# Patient Record
Sex: FEMALE | Race: WHITE | Employment: FULL TIME | URBAN - NONMETROPOLITAN AREA
[De-identification: names, ages, dates, MRNs, and addresses within clinical notes are randomized per-mention and may not be internally consistent; named-entity substitution may affect disease eponyms.]

---

## 2017-07-13 ENCOUNTER — TELEPHONE (OUTPATIENT)
Dept: FAMILY MEDICINE CLINIC | Age: 32
End: 2017-07-13

## 2018-01-24 ENCOUNTER — TELEPHONE (OUTPATIENT)
Dept: FAMILY MEDICINE CLINIC | Age: 33
End: 2018-01-24

## 2018-06-08 ENCOUNTER — OFFICE VISIT (OUTPATIENT)
Dept: INTERNAL MEDICINE CLINIC | Age: 33
End: 2018-06-08

## 2018-06-08 VITALS
RESPIRATION RATE: 20 BRPM | OXYGEN SATURATION: 99 % | SYSTOLIC BLOOD PRESSURE: 124 MMHG | DIASTOLIC BLOOD PRESSURE: 86 MMHG | BODY MASS INDEX: 21.68 KG/M2 | WEIGHT: 127 LBS | HEIGHT: 64 IN | HEART RATE: 74 BPM

## 2018-06-08 DIAGNOSIS — J30.9 CHRONIC ALLERGIC RHINITIS, UNSPECIFIED SEASONALITY, UNSPECIFIED TRIGGER: Primary | ICD-10-CM

## 2018-06-08 DIAGNOSIS — J01.01 ACUTE RECURRENT MAXILLARY SINUSITIS: ICD-10-CM

## 2018-06-08 DIAGNOSIS — G43.109 MIGRAINE WITH AURA AND WITHOUT STATUS MIGRAINOSUS, NOT INTRACTABLE: ICD-10-CM

## 2018-06-08 PROCEDURE — 99213 OFFICE O/P EST LOW 20 MIN: CPT | Performed by: NURSE PRACTITIONER

## 2018-06-08 RX ORDER — AZELASTINE 1 MG/ML
2 SPRAY, METERED NASAL 2 TIMES DAILY
Qty: 1 BOTTLE | Refills: 3 | Status: SHIPPED | OUTPATIENT
Start: 2018-06-08 | End: 2019-12-06

## 2018-06-08 RX ORDER — SUMATRIPTAN 25 MG/1
25 TABLET, FILM COATED ORAL
Qty: 30 TABLET | Refills: 0 | Status: SHIPPED | OUTPATIENT
Start: 2018-06-08 | End: 2020-01-30 | Stop reason: ALTCHOICE

## 2018-06-08 RX ORDER — AMOXICILLIN AND CLAVULANATE POTASSIUM 875; 125 MG/1; MG/1
1 TABLET, FILM COATED ORAL EVERY 12 HOURS
Qty: 20 TABLET | Refills: 0 | Status: SHIPPED | OUTPATIENT
Start: 2018-06-08 | End: 2018-06-18

## 2019-09-18 ENCOUNTER — HOSPITAL ENCOUNTER (OUTPATIENT)
Age: 34
Setting detail: SPECIMEN
Discharge: HOME | End: 2019-09-18
Payer: COMMERCIAL

## 2019-09-18 PROCEDURE — 86735 MUMPS ANTIBODY: CPT

## 2019-09-18 PROCEDURE — 86787 VARICELLA-ZOSTER ANTIBODY: CPT

## 2019-09-18 PROCEDURE — 86765 RUBEOLA ANTIBODY: CPT

## 2019-09-18 PROCEDURE — 86762 RUBELLA ANTIBODY: CPT

## 2019-09-20 LAB
MUV IGG SER QL: 9.8 AU/ML
MUV IGG SER QL: NORMAL AU/ML
RUBELLA ANTIBODY IGG: 9.8 IU/ML
RUBELLA ANTIBODY IGG: NORMAL IU/ML
RUBEOLA (MEASLES) AB IGG: 22.1 AU/ML
RUBEOLA (MEASLES) AB IGG: NORMAL AU/ML
VARICELLA-ZOSTER VIRUS AB, IGG: 1506 IV
VARICELLA-ZOSTER VIRUS AB, IGG: NORMAL IV

## 2019-12-06 ENCOUNTER — OFFICE VISIT (OUTPATIENT)
Dept: ORTHOPEDIC SURGERY | Age: 34
End: 2019-12-06
Payer: COMMERCIAL

## 2019-12-06 VITALS
RESPIRATION RATE: 16 BRPM | HEIGHT: 64 IN | WEIGHT: 127 LBS | BODY MASS INDEX: 21.68 KG/M2 | HEART RATE: 90 BPM | OXYGEN SATURATION: 98 %

## 2019-12-06 DIAGNOSIS — M22.8X1 PATELLAR MALTRACKING, RIGHT: ICD-10-CM

## 2019-12-06 DIAGNOSIS — M22.41 PATELLA, CHONDROMALACIA, RIGHT: Primary | ICD-10-CM

## 2019-12-06 PROCEDURE — 20610 DRAIN/INJ JOINT/BURSA W/O US: CPT | Performed by: PHYSICIAN ASSISTANT

## 2019-12-06 PROCEDURE — 99202 OFFICE O/P NEW SF 15 MIN: CPT | Performed by: PHYSICIAN ASSISTANT

## 2019-12-10 ASSESSMENT — ENCOUNTER SYMPTOMS
RESPIRATORY NEGATIVE: 1
EYES NEGATIVE: 1
GASTROINTESTINAL NEGATIVE: 1

## 2020-01-29 ENCOUNTER — NURSE TRIAGE (OUTPATIENT)
Dept: OTHER | Facility: CLINIC | Age: 35
End: 2020-01-29

## 2020-01-29 NOTE — TELEPHONE ENCOUNTER
Reason for Disposition   Sinus congestion (pressure, fullness) present > 10 days    Protocols used: SINUS PAIN AND CONGESTION-ADULT-[place]    Caller reports she is suffering from sinus pain/congestion. Recommended caller complete an E-visit at this time.

## 2020-01-30 ENCOUNTER — OFFICE VISIT (OUTPATIENT)
Dept: INTERNAL MEDICINE CLINIC | Age: 35
End: 2020-01-30
Payer: COMMERCIAL

## 2020-01-30 VITALS
SYSTOLIC BLOOD PRESSURE: 112 MMHG | OXYGEN SATURATION: 100 % | BODY MASS INDEX: 22.86 KG/M2 | DIASTOLIC BLOOD PRESSURE: 76 MMHG | WEIGHT: 133.2 LBS | HEART RATE: 72 BPM

## 2020-01-30 PROBLEM — N94.6 DYSMENORRHEA: Status: ACTIVE | Noted: 2020-01-30

## 2020-01-30 PROBLEM — G43.009 MIGRAINE WITHOUT AURA AND WITHOUT STATUS MIGRAINOSUS, NOT INTRACTABLE: Status: ACTIVE | Noted: 2020-01-30

## 2020-01-30 PROCEDURE — 99204 OFFICE O/P NEW MOD 45 MIN: CPT | Performed by: FAMILY MEDICINE

## 2020-01-30 RX ORDER — AMITRIPTYLINE HYDROCHLORIDE 10 MG/1
10 TABLET, FILM COATED ORAL NIGHTLY
Qty: 30 TABLET | Refills: 0 | Status: SHIPPED | OUTPATIENT
Start: 2020-01-30 | End: 2021-12-28 | Stop reason: ALTCHOICE

## 2020-01-30 RX ORDER — MEDROXYPROGESTERONE ACETATE 150 MG/ML
150 INJECTION, SUSPENSION INTRAMUSCULAR
Qty: 1 ML | Refills: 3
Start: 2020-01-30 | End: 2020-01-30

## 2020-01-30 RX ORDER — RIZATRIPTAN BENZOATE 5 MG/1
5 TABLET ORAL
Qty: 30 TABLET | Refills: 0 | Status: SHIPPED | OUTPATIENT
Start: 2020-01-30 | End: 2020-07-09 | Stop reason: ALTCHOICE

## 2020-01-30 ASSESSMENT — ENCOUNTER SYMPTOMS
ABDOMINAL PAIN: 0
CONSTIPATION: 0
SORE THROAT: 0
RHINORRHEA: 1
VOMITING: 0
COLOR CHANGE: 0
DIARRHEA: 0
CHEST TIGHTNESS: 0
SHORTNESS OF BREATH: 0

## 2020-01-30 NOTE — PROGRESS NOTES
Subjective:      Chief Complaint   Patient presents with    Established New Doctor    Chest Congestion     Nasal congestion, body aches, throat sore. Headache worse    Medication Check     Taking tylenol sinus       HPI:  Nhi Willett is a 29 y.o. female who presents today to establish care. Does have a few concerns today. Has known history of migraines- was prescribed imitrex over a year ago, but does not help symptoms- states it only makes her lightheaded and nauseous. Has migraines about 3-4 times a week. No aura. Has been using OTC excedrin, which seems to help. Has never been prescribed any other migraine medication. Current migraine has lasted 5 days. Has also been having URI symptoms for the past 3 days. Having congestion, rhinorrhea, Tmax 100. Also asking about depo provera- was previously getting shots every 3 months but is switching providers and has not had injection since Sept 2019. Past Medical History:   Diagnosis Date    Anxiety     Chronic back pain     Depression 7/16/2015    Headache(784.0)     Ovarian cyst     multi    Restless legs syndrome         Past Surgical History:   Procedure Laterality Date    APPENDECTOMY      CYST REMOVAL      head     HERNIA REPAIR      OTHER SURGICAL HISTORY  07/08/2016    [facility]       Social History     Tobacco Use    Smoking status: Never Smoker    Smokeless tobacco: Never Used   Substance Use Topics    Alcohol use: Yes     Alcohol/week: 0.0 standard drinks     Comment: Socially        Review of Systems   Constitutional: Negative for activity change, appetite change, chills, fever and unexpected weight change. HENT: Positive for congestion, postnasal drip and rhinorrhea. Negative for sore throat. Respiratory: Negative for chest tightness and shortness of breath. Cardiovascular: Negative for chest pain and palpitations. Gastrointestinal: Negative for abdominal pain, constipation, diarrhea and vomiting. Genitourinary: Negative for dysuria. Skin: Negative for color change. Neurological: Positive for headaches. Negative for dizziness and light-headedness. Psychiatric/Behavioral: Negative for dysphoric mood. The patient is not nervous/anxious. Prior to Visit Medications    Medication Sig Taking? Authorizing Provider   Aspirin-Acetaminophen-Caffeine (EXCEDRIN MIGRAINE PO) Take 2,000 mg by mouth every morning Yes Historical Provider, MD          Objective:      /76   Pulse 72   Wt 133 lb 3.2 oz (60.4 kg)   SpO2 100%   BMI 22.86 kg/m²      Physical Exam  Vitals signs and nursing note reviewed. Constitutional:       General: She is not in acute distress. Appearance: Normal appearance. She is not ill-appearing or toxic-appearing. HENT:      Head: Normocephalic and atraumatic. Right Ear: External ear normal.      Left Ear: External ear normal.      Nose: Nose normal.      Mouth/Throat:      Pharynx: Oropharynx is clear. Eyes:      General: No scleral icterus. Right eye: No discharge. Left eye: No discharge. Extraocular Movements: Extraocular movements intact. Conjunctiva/sclera: Conjunctivae normal.   Neck:      Musculoskeletal: Normal range of motion and neck supple. No neck rigidity. Cardiovascular:      Rate and Rhythm: Normal rate and regular rhythm. Heart sounds: Normal heart sounds. Pulmonary:      Effort: Pulmonary effort is normal.      Breath sounds: Normal breath sounds. No wheezing or rales. Musculoskeletal:         General: No deformity. Skin:     General: Skin is warm and dry. Findings: No rash. Neurological:      General: No focal deficit present. Mental Status: She is alert. Mental status is at baseline. Motor: No weakness. Psychiatric:         Mood and Affect: Mood normal.         Behavior: Behavior normal.            Assessment / Plan:      1.  Migraine without aura and without status migrainosus, not

## 2020-07-09 ENCOUNTER — OFFICE VISIT (OUTPATIENT)
Dept: FAMILY MEDICINE CLINIC | Age: 35
End: 2020-07-09
Payer: COMMERCIAL

## 2020-07-09 VITALS
HEART RATE: 79 BPM | DIASTOLIC BLOOD PRESSURE: 84 MMHG | WEIGHT: 116.8 LBS | BODY MASS INDEX: 20.05 KG/M2 | SYSTOLIC BLOOD PRESSURE: 138 MMHG | OXYGEN SATURATION: 99 % | TEMPERATURE: 97.1 F

## 2020-07-09 PROCEDURE — 99213 OFFICE O/P EST LOW 20 MIN: CPT | Performed by: PHYSICIAN ASSISTANT

## 2020-07-09 RX ORDER — GABAPENTIN 300 MG/1
300 CAPSULE ORAL DAILY
Qty: 30 CAPSULE | Refills: 0 | Status: SHIPPED | OUTPATIENT
Start: 2020-07-09 | End: 2020-08-31 | Stop reason: SDUPTHER

## 2020-07-09 RX ORDER — VALACYCLOVIR HYDROCHLORIDE 1 G/1
1000 TABLET, FILM COATED ORAL 3 TIMES DAILY
Qty: 21 TABLET | Refills: 0 | Status: SHIPPED | OUTPATIENT
Start: 2020-07-09 | End: 2020-07-16

## 2020-07-09 NOTE — LETTER
[address] [facility],[address] WALK-IN CARE  [address] [place] [address] [id number]  Phone: [phone]  Fax: [phone]    Sujatha Tammy        July 9, 2020     Patient: Felix Leal   YOB: 1985   Date of Visit: 7/9/2020       To Whom it May Concern:    Felix Leal was seen in my clinic on 7/9/2020. She is cleared to return to work today without restrictions. If you have any questions or concerns, please don't hesitate to call.     Sincerely,           Amol Christian PA-C

## 2020-07-09 NOTE — PATIENT INSTRUCTIONS
Patient Education        Shingles: Care Instructions  Your Care Instructions     Shingles (herpes zoster) causes pain and a blistered rash. The rash can appear anywhere on the body but will be on only one side of the body, the left or right. It will be in a band, a strip, or a small area. The pain can be very severe. Shingles can also cause tingling or itching in the area of the rash. The blisters scab over after a few days and heal in 2 to 4 weeks. Medicines can help you feel better and may help prevent more serious problems caused by shingles. Shingles is caused by the same virus that causes chickenpox. When you have chickenpox, the virus gets into your nerve roots and stays there (becomes dormant) long after you get over the chickenpox. If the virus becomes active again, it can cause shingles. Follow-up care is a key part of your treatment and safety. Be sure to make and go to all appointments, and call your doctor if you are having problems. It's also a good idea to know your test results and keep a list of the medicines you take. How can you care for yourself at home? · Be safe with medicines. Take your medicines exactly as prescribed. Call your doctor if you think you are having a problem with your medicine. Antiviral medicine helps you get better faster. · Try not to scratch or pick at the blisters. They will crust over and fall off on their own if you leave them alone. · Put cool, wet cloths on the area to relieve pain and itching. You can also use calamine lotion. Try not to use so much lotion that it cakes and is hard to get off. · Put cornstarch or baking soda on the sores to help dry them out so they heal faster. · Do not use thick ointment, such as petroleum jelly, on the sores. This will keep them from drying and healing. · To help remove loose crusts, soak them in tap water. This can help decrease oozing, and dry and soothe the skin.   · Take an over-the-counter pain medicine, such as acetaminophen (Tylenol), ibuprofen (Advil, Motrin), or naproxen (Aleve). Read and follow all instructions on the label. · Avoid close contact with people until the blisters have healed. It is very important for you to avoid contact with anyone who has never had chickenpox or the chickenpox vaccine. Pregnant women, young babies, and anyone else who has a hard time fighting infection (such as someone with HIV, diabetes, or cancer) is especially at risk. When should you call for help? Call your doctor now or seek immediate medical care if:  · You have a new or higher fever. · You have a severe headache and a stiff neck. · You lose the ability to think clearly. · The rash spreads to your forehead, nose, eyes, or eyelids. · You have eye pain, or your vision gets worse. · You have new pain in your face, or you cannot move the muscles in your face. · Blisters spread to new parts of your body. Watch closely for changes in your health, and be sure to contact your doctor if:  · The rash has not healed after 2 to 4 weeks. · You still have pain after the rash has healed. Where can you learn more? Go to https://AmiarepeMyxer.opvizor. org and sign in to your [employer] account. Kan Speak in the LOOKSIMA box to learn more about \"Shingles: Care Instructions. \"     If you do not have an account, please click on the \"Sign Up Now\" link. Current as of: February 11, 2020               Content Version: 12.5  © 2006-2020 [employer]wise, Incorporated. Care instructions adapted under license by [facility] ([facility]). If you have questions about a medical condition or this instruction, always ask your healthcare professional. [address] any warranty or liability for your use of this information.

## 2020-07-09 NOTE — PROGRESS NOTES
7/9/2020    Corinne Pedlar    Chief Complaint   Patient presents with    Herpes Zoster       HPI  History was obtained from patient. Lang Garrett is a 28 y.o. female who presents today with complaints of possible shingles. She states that yesterday, she had a burning type pain right mid back. She states that she woke up this morning and it is very excruciating and burning pain. Her T-shirt even bothers the area. She states that there is a rash developing. She admits to having chickenpox as a child. She thinks that she may have had shingles in this exact location 3 months ago. She denies cold-like symptoms, fever, chills. She has no other complaints. REVIEW OF SYMPTOMS    Constitutional:  Denies fever, chills, weight loss or weakness  ENT:  Denies sinus congestion, drainage, sore throat  Cardiovascular:  Denies chest pain, palpitations or swelling  Respiratory:  Denies cough or shortness of breath  Musculoskeletal: Admits right-sided mid back pain. See HPI. Skin: Admits to developing a rash on right side of mid back.   See HPI  Neurologic:  Denies headache    PAST MEDICAL HISTORY  Past Medical History:   Diagnosis Date    Anxiety     Chronic back pain     Depression 7/16/2015    Headache(784.0)     Ovarian cyst     multi    Restless legs syndrome        FAMILY HISTORY  Family History   Problem Relation Age of Onset    Cancer Father         pancreas and lungs     Hypertension Father     Diabetes Father     Heart Disease Father     Heart Attack Father     Coronary Art Dis Father     High Cholesterol Father     Other Mother         autoimmune disease [employer] /liver issues     Anemia Mother     Arthritis Mother     Depression Sister     Clotting Disorder Maternal Grandmother     Cancer Maternal Grandfather     Parkinsonism Paternal Grandfather     Anxiety Disorder Sister        SOCIAL HISTORY  Social History     Socioeconomic History    Marital status:      Spouse name: None    Breath and Swelling    Augmentin [Amoxicillin-Pot Clavulanate]      Blurred Vision    Penicillins      Blurred vision        PHYSICAL EXAM    /84   Pulse 79   Temp 97.1 °F (36.2 °C) (Temporal)   Wt 116 lb 12.8 oz (53 kg)   SpO2 99%   BMI 20.05 kg/m²     Constitutional:  Well developed, well nourished  HENT:  Normocephalic, atraumatic  Eyes:  conjunctiva normal, no discharge, no scleral icterus  Lymphatic:  No lymphadenopathy noted  Cardiovascular:  Normal heart rate, normal rhythm, no murmurs, gallops or rubs  Thorax & Lungs:  Normal breath sounds, no respiratory distress, no wheezing  Skin: 1 vesicular lesion just superior to right flank. Back: There is exquisite tenderness to palpation just above the right flank that radiates around the right lateral ribs. No flank pain. No bony abnormality palpable. Neurologic:  Alert & oriented   Psychiatric:  Affect normal, mood normal    ASSESSMENT & [facility] was seen today for herpes zoster. Diagnoses and all orders for this visit:    Herpes zoster dermatitis  -     valACYclovir (VALTREX) 1 g tablet; Take 1 tablet by mouth 3 times daily for 7 days  -     gabapentin (NEURONTIN) 300 MG capsule; Take 1 capsule by mouth daily for 30 days. Valtrex and gabapentin as noted above. Patient understands that she is not to work or drive while taking gabapentin. We discussed possible side effects of both of these medicines in detail and patient voiced understanding and wishes to continue. She is to avoid known contact with anyone who has either not had chickenpox or has not been vaccinated against it. She is to avoid known contact with pregnant women. She is able to return to work because this rash is in an area that is well covered with clothing. Patient was warned about postherpetic neuralgia. Patient voiced understanding and all questions were answered.     Medications Discontinued During This Encounter   Medication Reason    rizatriptan (MAXALT) 5 MG tablet Therapy completed        No follow-ups on file. Plan of care reviewed with patient who verbalizes understanding and wishes to continue. Patient verbalizes understanding with the above plan and is in agreement. Patient will call with  worsening of symptoms, questions or concerns. Please note that this chart was generated using dragon dictation software. Although every effort was made to ensure the accuracy of this automated transcription, some errors in transcription may have occurred.     Electronically signed by Hector White PA-C on 7/9/2020

## 2020-08-03 RX ORDER — AZITHROMYCIN 250 MG/1
250 TABLET, FILM COATED ORAL SEE ADMIN INSTRUCTIONS
Qty: 6 TABLET | Refills: 0 | Status: SHIPPED | OUTPATIENT
Start: 2020-08-03 | End: 2020-08-08

## 2020-08-18 ENCOUNTER — TELEPHONE (OUTPATIENT)
Dept: ORTHOPEDIC SURGERY | Age: 35
End: 2020-08-18

## 2020-08-18 RX ORDER — PREDNISONE 20 MG/1
20 TABLET ORAL 2 TIMES DAILY
Qty: 14 TABLET | Refills: 0 | Status: SHIPPED | OUTPATIENT
Start: 2020-08-18 | End: 2020-08-25

## 2020-08-28 ENCOUNTER — PATIENT MESSAGE (OUTPATIENT)
Dept: INTERNAL MEDICINE CLINIC | Age: 35
End: 2020-08-28

## 2020-08-31 RX ORDER — GABAPENTIN 300 MG/1
300 CAPSULE ORAL DAILY
Qty: 30 CAPSULE | Refills: 0 | Status: SHIPPED | OUTPATIENT
Start: 2020-08-31 | End: 2021-12-28 | Stop reason: ALTCHOICE

## 2021-11-15 ENCOUNTER — HOSPITAL ENCOUNTER (OUTPATIENT)
Age: 36
Setting detail: SPECIMEN
Discharge: HOME | End: 2021-11-15
Payer: COMMERCIAL

## 2021-11-15 ENCOUNTER — OFFICE VISIT (OUTPATIENT)
Dept: OBGYN | Age: 36
End: 2021-11-15
Payer: COMMERCIAL

## 2021-11-15 VITALS
HEIGHT: 64 IN | WEIGHT: 118 LBS | DIASTOLIC BLOOD PRESSURE: 94 MMHG | BODY MASS INDEX: 20.14 KG/M2 | SYSTOLIC BLOOD PRESSURE: 142 MMHG

## 2021-11-15 DIAGNOSIS — Z01.419 WOMEN'S ANNUAL ROUTINE GYNECOLOGICAL EXAMINATION: Primary | ICD-10-CM

## 2021-11-15 DIAGNOSIS — Z80.41 FAMILY HISTORY OF MALIGNANT NEOPLASM OF OVARY IN FIRST DEGREE RELATIVE: ICD-10-CM

## 2021-11-15 DIAGNOSIS — R03.0 ELEVATED BLOOD PRESSURE READING IN OFFICE WITH WHITE COAT SYNDROME, WITHOUT DIAGNOSIS OF HYPERTENSION: ICD-10-CM

## 2021-11-15 DIAGNOSIS — N92.6 IRREGULAR MENSES: ICD-10-CM

## 2021-11-15 DIAGNOSIS — N64.4 MASTALGIA: ICD-10-CM

## 2021-11-15 PROCEDURE — 87624 HPV HI-RISK TYP POOLED RSLT: CPT

## 2021-11-15 PROCEDURE — 99385 PREV VISIT NEW AGE 18-39: CPT | Performed by: NURSE PRACTITIONER

## 2021-11-15 PROCEDURE — 88142 CYTOPATH C/V THIN LAYER: CPT

## 2021-11-15 RX ORDER — MEDROXYPROGESTERONE ACETATE 150 MG/ML
150 INJECTION, SUSPENSION INTRAMUSCULAR
Qty: 1 ML | Refills: 3 | Status: SHIPPED | OUTPATIENT
Start: 2021-11-15 | End: 2022-09-28

## 2021-11-15 SDOH — ECONOMIC STABILITY: FOOD INSECURITY: WITHIN THE PAST 12 MONTHS, THE FOOD YOU BOUGHT JUST DIDN'T LAST AND YOU DIDN'T HAVE MONEY TO GET MORE.: NEVER TRUE

## 2021-11-15 SDOH — ECONOMIC STABILITY: FOOD INSECURITY: WITHIN THE PAST 12 MONTHS, YOU WORRIED THAT YOUR FOOD WOULD RUN OUT BEFORE YOU GOT MONEY TO BUY MORE.: NEVER TRUE

## 2021-11-15 ASSESSMENT — ENCOUNTER SYMPTOMS
RESPIRATORY NEGATIVE: 1
GASTROINTESTINAL NEGATIVE: 1

## 2021-11-15 ASSESSMENT — SOCIAL DETERMINANTS OF HEALTH (SDOH): HOW HARD IS IT FOR YOU TO PAY FOR THE VERY BASICS LIKE FOOD, HOUSING, MEDICAL CARE, AND HEATING?: NOT HARD AT ALL

## 2021-11-15 NOTE — PROGRESS NOTES
11/15/21    Stanton Harmon  1985    Chief Complaint   Patient presents with    Gynecologic Exam     pt c/o right breast lump x 6 wks, dull pain, radiates down into ribs. menstrual nornal, Lmp 10/15/21. requesting to start depo provera. last pap - 2/27/2018. normal.     Breast Problem        The patient is a 39 y.o. female, Margaux Gates who presents for her annual exam.  She amenorrhea since discontinuing Depo Provera 4/2021. She is  sexually active. She is not currently taking birth control    She reports no gynecological symptoms. Pap smear history: Her last PAP smear was in 2018. Her results were normal.    Past Medical History:   Diagnosis Date    Abnormal Pap smear of cervix     Anxiety     Breast lump in female     Chronic back pain     Depression 7/16/2015    Headache(784.0)     Ovarian cyst     multi    Restless legs syndrome        Past Surgical History:   Procedure Laterality Date    APPENDECTOMY      CYST REMOVAL      head     HERNIA REPAIR      OTHER SURGICAL HISTORY  07/08/2016    lap appy       Family History   Problem Relation Age of Onset    Cancer Father         pancreas and lungs     Hypertension Father     Diabetes Father     Heart Disease Father     Heart Attack Father     Coronary Art Dis Father     High Cholesterol Father     Other Mother         autoimmune disease [facility] Kerene /liver issues     Anemia Mother     Arthritis Mother     Depression Sister     Clotting Disorder Maternal Grandmother     Cancer Maternal Grandfather     Parkinsonism Paternal Grandfather     Anxiety Disorder Sister        Social History     Tobacco Use    Smoking status: Never Smoker    Smokeless tobacco: Never Used   Vaping Use    Vaping Use: Never used   Substance Use Topics    Alcohol use:  Yes     Alcohol/week: 0.0 standard drinks     Comment: Socially    Drug use: No       Current Outpatient Medications   Medication Sig Dispense Refill    medroxyPROGESTERone (DEPO-PROVERA) 150 MG/ML injection Inject 1 mL into the muscle every 3 months 1 mL 3    Aspirin-Acetaminophen-Caffeine (EXCEDRIN MIGRAINE PO) Take 2,000 mg by mouth every morning       gabapentin (NEURONTIN) 300 MG capsule Take 1 capsule by mouth daily for 30 days. 30 capsule 0    amitriptyline (ELAVIL) 10 MG tablet Take 1 tablet by mouth nightly (Patient not taking: Reported on 11/15/2021) 30 tablet 0     No current facility-administered medications for this visit. Allergies   Allergen Reactions    Benadryl [Diphenhydramine-Zinc Acetate] Shortness Of Breath and Swelling    Augmentin [Amoxicillin-Pot Clavulanate]      Blurred Vision    Penicillins      Blurred vision        [id number]    Immunization History   Administered Date(s) Administered    DTaP 07/12/2014       Review of Systems   Constitutional: Negative. Respiratory: Negative. Gastrointestinal: Negative. Genitourinary: Negative. BP (!) 142/94   Ht 5' 4\" (1.626 m)   Wt 118 lb (53.5 kg)   LMP 10/15/2021   BMI 20.25 kg/m²     Physical Exam  Vitals reviewed. Constitutional:       Appearance: Normal appearance. HENT:      Head: Normocephalic. Pulmonary:      Effort: Pulmonary effort is normal.   Chest:   Breasts:      Right: Tenderness present. Left: Normal.         Abdominal:      Palpations: Abdomen is soft. Genitourinary:     General: Normal vulva. Vagina: Normal.      Cervix: Normal.      Uterus: Normal.       Adnexa: Right adnexa normal and left adnexa normal.      Rectum: Normal.   Musculoskeletal:         General: Normal range of motion. Cervical back: Normal range of motion. Skin:     General: Skin is warm and dry. Neurological:      Mental Status: She is alert. Psychiatric:         Mood and Affect: Mood normal.         Behavior: Behavior normal.         No results found for this visit on 11/15/21. Assessment and Plan   Diagnosis Orders   1. Women's annual routine gynecological examination  PAP SMEAR   2. Mastalgia  ALEJANDRO ADRIANNA DIGITAL DIAGNOSTIC UNILATERAL RIGHT    US BREAST RIGHT COMPLETE   3. Family history of malignant neoplasm of ovary in first degree relative     4. Irregular menses     5. Elevated blood pressure reading in office with white coat syndrome, without diagnosis of hypertension       Family history of ovarian cancer; mother and two maternal aunts. Info given on genetic counseling    BPs discussed; c/o white coat syndrome. Monitors BPs at work with readings of 130s/98, reviewed pre-hypertension state; admits to frequent Headaches, denies chest/abd pain and scotoma. Pt encouraged to monitor and f/u with PCP with readings over 140/90      R breast mammogram with u/s ordered        Return in about 1 year (around 11/15/2022).     Lyric Carter, APRN - CNP

## 2021-11-18 LAB
HPV HIGH RISK: NOT DETECTED
HPV, GENOTYPE 16: NOT DETECTED
HPV, GENOTYPE 18: NOT DETECTED

## 2021-12-28 ENCOUNTER — HOSPITAL ENCOUNTER (EMERGENCY)
Age: 36
Discharge: HOME | End: 2021-12-28
Attending: EMERGENCY MEDICINE
Payer: COMMERCIAL

## 2021-12-28 VITALS
TEMPERATURE: 98.5 F | DIASTOLIC BLOOD PRESSURE: 98 MMHG | SYSTOLIC BLOOD PRESSURE: 181 MMHG | WEIGHT: 115 LBS | BODY MASS INDEX: 19.63 KG/M2 | HEART RATE: 96 BPM | RESPIRATION RATE: 18 BRPM | OXYGEN SATURATION: 100 % | HEIGHT: 64 IN

## 2021-12-28 DIAGNOSIS — I10 ESSENTIAL HYPERTENSION: Primary | ICD-10-CM

## 2021-12-28 LAB
BACTERIA: ABNORMAL /HPF
BILIRUBIN URINE: NEGATIVE MG/DL
BLOOD, URINE: ABNORMAL
CAST TYPE: ABNORMAL /HPF
CLARITY: CLEAR
COLOR: YELLOW
CRYSTAL TYPE: NEGATIVE /HPF
EKG ATRIAL RATE: 76 BPM
EKG DIAGNOSIS: NORMAL
EKG P AXIS: 9 DEGREES
EKG P-R INTERVAL: 88 MS
EKG Q-T INTERVAL: 382 MS
EKG QRS DURATION: 76 MS
EKG QTC CALCULATION (BAZETT): 429 MS
EKG R AXIS: 39 DEGREES
EKG T AXIS: 26 DEGREES
EKG VENTRICULAR RATE: 76 BPM
EPITHELIAL CELLS, UA: 3 /HPF
GLUCOSE BLD-MCNC: 129 MG/DL
GLUCOSE BLD-MCNC: 129 MG/DL (ref 70–99)
GLUCOSE, URINE: NEGATIVE MG/DL
INTERPRETATION: NORMAL
KETONES, URINE: ABNORMAL MG/DL
LEUKOCYTE ESTERASE, URINE: NEGATIVE
NITRITE URINE, QUANTITATIVE: NEGATIVE
PH, URINE: 7 (ref 5–8)
PREGNANCY, URINE: NEGATIVE
PROTEIN UA: NEGATIVE MG/DL
RBC URINE: 4 /HPF (ref 0–6)
SPECIFIC GRAVITY UA: 1.02 (ref 1–1.03)
SPECIFIC GRAVITY, URINE: 1.02 (ref 1–1.03)
UROBILINOGEN, URINE: 0.2 MG/DL (ref 0.2–1)
WBC UA: 4 /HPF (ref 0–5)

## 2021-12-28 PROCEDURE — 99283 EMERGENCY DEPT VISIT LOW MDM: CPT

## 2021-12-28 PROCEDURE — 93010 ELECTROCARDIOGRAM REPORT: CPT | Performed by: INTERNAL MEDICINE

## 2021-12-28 PROCEDURE — 81025 URINE PREGNANCY TEST: CPT

## 2021-12-28 PROCEDURE — 82962 GLUCOSE BLOOD TEST: CPT

## 2021-12-28 PROCEDURE — 93005 ELECTROCARDIOGRAM TRACING: CPT | Performed by: EMERGENCY MEDICINE

## 2021-12-28 PROCEDURE — 81001 URINALYSIS AUTO W/SCOPE: CPT

## 2021-12-28 RX ORDER — LISINOPRIL AND HYDROCHLOROTHIAZIDE 12.5; 1 MG/1; MG/1
1 TABLET ORAL DAILY
Qty: 30 TABLET | Refills: 3 | Status: SHIPPED | OUTPATIENT
Start: 2021-12-28 | End: 2022-05-24

## 2021-12-28 NOTE — ED PROVIDER NOTES
Triage Chief Complaint:   Hypertension (x 1 week 180s/90 pt sent by pcp ) and Palpitations    [place]:  Sancho Duckworth is a 39 y.o. female that presents to the ED because of elevated blood pressure for the past weeks. States was over 150 the patient was recently started back on Depo about a month ago she has been having irregular. Currently bleeding she was off the year prior she is never been diagnosed with hypertension in the past.  She denies any headache but complains of some visual changes and palpitations. Denies any excessive alcohol. She drink socially no tobacco no marijuana no drugs. No chest pain or shortness of breath. She has made some weight gain about 12 to 15 pounds since she started the Depo.   Her rings are fitting little tighter        Past Medical History:   Diagnosis Date    Abnormal Pap smear of cervix     Anxiety     Breast lump in female     Chronic back pain     Depression 7/16/2015    Headache(784.0)     Ovarian cyst     multi    Restless legs syndrome      Past Surgical History:   Procedure Laterality Date    APPENDECTOMY      CYST REMOVAL      head     HERNIA REPAIR      OTHER SURGICAL HISTORY  07/08/2016    bry vila     Family History   Problem Relation Age of Onset    Cancer Father         pancreas and lungs     Hypertension Father     Diabetes Father     Heart Disease Father     Heart Attack Father     Coronary Art Dis Father     High Cholesterol Father     Other Mother         autoimmune disease Marilynne Meron /liver issues     Anemia Mother     Arthritis Mother     Depression Sister     Clotting Disorder Maternal Grandmother     Cancer Maternal Grandfather     Parkinsonism Paternal Grandfather     Anxiety Disorder Sister      Social History     Socioeconomic History    Marital status:      Spouse name: Not on file    Number of children: Not on file    Years of education: Not on file    Highest education level: Not on file   Occupational History    Not on file   Tobacco Use    Smoking status: Never Smoker    Smokeless tobacco: Never Used   Vaping Use    Vaping Use: Never used   Substance and Sexual Activity    Alcohol use: Yes     Alcohol/week: 0.0 standard drinks     Comment: Socially    Drug use: No    Sexual activity: Yes   Other Topics Concern    Not on file   Social History Narrative    Not on file     Social Determinants of Health     Financial Resource Strain: Low Risk     Difficulty of Paying Living Expenses: Not hard at all   Food Insecurity: No Food Insecurity    Worried About [address] in the Last Year: Never true    [facility] in the Last Year: Never true   Transportation Needs:     Lack of Transportation (Medical): Not on file    Lack of Transportation (Non-Medical): Not on file   Physical Activity:     Days of Exercise per Week: Not on file    Minutes of Exercise per Session: Not on file   Stress:     Feeling of Stress : Not on file   Social Connections:     Frequency of Communication with Friends and Family: Not on file    Frequency of Social Gatherings with Friends and Family: Not on file    Attends Voodoo Services: Not on file    Active Member of [address] [place] [address] or Organizations: Not on file    Attends Club or Organization Meetings: Not on file    Marital Status: Not on file   Intimate Partner Violence:     Fear of Current or Ex-Partner: Not on file    Emotionally Abused: Not on file    Physically Abused: Not on file    Sexually Abused: Not on file   Housing Stability:     Unable to Pay for Housing in the Last Year: Not on file    Number of [place] in the Last Year: Not on file    Unstable Housing in the Last Year: Not on file     No current facility-administered medications for this encounter.      Current Outpatient Medications   Medication Sig Dispense Refill    lisinopril-hydroCHLOROthiazide (PRINZIDE;ZESTORETIC) 10-12.5 MG per tablet Take 1 tablet by mouth daily 30 tablet 3    medroxyPROGESTERone tenderness. Abdominal:      General: Bowel sounds are normal. There is no distension. Palpations: Abdomen is soft. There is no mass. Tenderness: There is no abdominal tenderness. There is no guarding or rebound. Hernia: No hernia is present. Musculoskeletal:         General: No tenderness or deformity. Normal range of motion. Cervical back: Normal range of motion and neck supple. Lymphadenopathy:      Cervical: No cervical adenopathy. Skin:     General: Skin is warm and dry. Coloration: Skin is not pale. Findings: No erythema or rash. Neurological:      Mental Status: She is alert and oriented to person, place, and time. Cranial Nerves: No cranial nerve deficit. Sensory: No sensory deficit. Deep Tendon Reflexes: Reflexes are normal and symmetric. Reflexes normal.   Psychiatric:         Speech: Speech normal.         Behavior: Behavior normal.         Thought Content:  Thought content normal.         Judgment: Judgment normal.         I have reviewed and interpreted all of the currently available lab results from this visit (ifapplicable):  Results for orders placed or performed during the hospital encounter of 12/28/21   Urinalysis, reflex to microscopic   Result Value Ref Range    Color, UA YELLOW YELLOW    Clarity, UA CLEAR CLEAR    Glucose, Urine NEGATIVE NEGATIVE MG/DL    Bilirubin Urine NEGATIVE NEGATIVE MG/DL    Ketones, Urine 1+ (A) NEGATIVE MG/DL    Specific Gravity, UA 1.020 1.001 - 1.035    Blood, Urine 2+ (A) NEGATIVE    pH, Urine 7.0 5.0 - 8.0    Protein, UA NEGATIVE NEGATIVE MG/DL    Urobilinogen, Urine 0.2 0.2 - 1.0 MG/DL    Nitrite Urine, Quantitative NEGATIVE NEGATIVE    Leukocyte Esterase, Urine NEGATIVE NEGATIVE    RBC, UA 4 0 - 6 /HPF    WBC, UA 4 0 - 5 /HPF    Epithelial Cells, UA 3 /HPF    Cast Type NO CAST FORMS SEEN NO CAST FORMS SEEN /HPF    Bacteria, UA RARE (A) NEGATIVE /HPF    Crystal Type NEGATIVE NEGATIVE /HPF   Pregnancy, Urine Result Value Ref Range    Pregnancy, Urine NEGATIVE NEGATIVE    Specific Gravity, Urine 1.020 1.001 - 1.035    Interpretation HCG METHOD LIMITATIONS:    POCT Glucose   Result Value Ref Range    Glucose 129 mg/dL   POCT Glucose   Result Value Ref Range    POC Glucose 129 (H) 70 - 99 MG/DL   EKG 12 Lead   Result Value Ref Range    Ventricular Rate 76 BPM    Atrial Rate 76 BPM    P-R Interval 88 ms    QRS Duration 76 ms    Q-T Interval 382 ms    QTc Calculation (Bazett) 429 ms    P Axis 9 degrees    R Axis 39 degrees    T Axis 26 degrees    Diagnosis       Sinus rhythm with short RI  Otherwise normal ECG  No previous ECGs available        Radiographs (if obtained):  [] The following radiograph wasinterpreted by myself in the absence of a radiologist:   [] Radiologist's Report Reviewed:  No orders to display         EKG (if obtained): (All EKG's are interpreted by myself in the absence of a cardiologist)    The 12 lead EKG was interpreted by me, and the interpretation is as follows:  normal sinus rhythm, rate = 76. Intervals are within the normal range. QTc is not prolonged. ST elevations are not present. T wave inversions are not present. Non-specific T wave changes are not present. Delta waves, Brugada Syndrome, and Short RI are not present. There is no acute ischemia. Chart review shows recent radiographs:  No results found. MDM:      Patient presents ED with new onset hypertension. She has had values at home and was of an elevated she meets criteria for therapy will initially start on lisinopril 10/hydrochlorothiazide 12.5 patient was found not to be pregnant urinalysis is unremarkable need to follow-up next week    Please note that portions of this note may have been completed with a voice recognition/dictation program. Efforts were made to edit the dictations but occasionally words are mis-transcribed.      All pertinent Lab data and radiographic results reviewed with patient at bedside.  The patient and/or the family were informed of the results of any tests/labs/imaging, the treatment plan, and time was allotted to answer questions. See chart for details of medications given during the ED stay.     The likelihood of other entities in the differential is insufficient to justify any further testing for them. This was explained to the patient. The patient was advised that persistent or worsening symptoms would require further evaluation.              Clinical Impression:  1. Essential hypertension      Disposition referral (if applicable):  Alaina Simons MD  [facility] [address] [phone]    Schedule an appointment as soon as possible for a visit   If symptoms worsen    Disposition medications (if applicable):  New Prescriptions    LISINOPRIL-HYDROCHLOROTHIAZIDE (PRINZIDE;ZESTORETIC) 10-12.5 MG PER TABLET    Take 1 tablet by mouth daily           Moiz Meza DO, FACEP      Comment: Please note this report has been produced using speech recognition software and maycontain errors related to that system including errors in grammar, punctuation, and spelling, as well as words and phrases that may be inappropriate. If there are any questions or concerns please feel free to contact thedictating provider for clarification.         Gemma Grimm DO  12/28/21 [id number]

## 2022-05-13 ENCOUNTER — OFFICE VISIT (OUTPATIENT)
Dept: OBGYN | Age: 37
End: 2022-05-13
Payer: COMMERCIAL

## 2022-05-13 VITALS
WEIGHT: 123 LBS | HEIGHT: 64 IN | DIASTOLIC BLOOD PRESSURE: 80 MMHG | BODY MASS INDEX: 21 KG/M2 | SYSTOLIC BLOOD PRESSURE: 161 MMHG

## 2022-05-13 DIAGNOSIS — G89.29 CHRONIC FEMALE PELVIC PAIN: Primary | ICD-10-CM

## 2022-05-13 DIAGNOSIS — N92.6 IRREGULAR MENSES: ICD-10-CM

## 2022-05-13 DIAGNOSIS — N74 FEMALE PELVIC INFLAMMATORY DISORDERS IN DISEASES CLASSIFIED ELSEWHERE: ICD-10-CM

## 2022-05-13 DIAGNOSIS — R10.2 CHRONIC FEMALE PELVIC PAIN: Primary | ICD-10-CM

## 2022-05-13 DIAGNOSIS — N89.8 VAGINAL DISCHARGE: ICD-10-CM

## 2022-05-13 LAB
BACTERIA: ABNORMAL /HPF
BASOPHILS ABSOLUTE: 0 K/UL (ref 0–0.2)
BASOPHILS RELATIVE PERCENT: 0.5 %
BILIRUBIN URINE: NEGATIVE
BLOOD, URINE: NEGATIVE
CLARITY: CLEAR
COLOR: YELLOW
EOSINOPHILS ABSOLUTE: 0 K/UL (ref 0–0.6)
EOSINOPHILS RELATIVE PERCENT: 0.4 %
EPITHELIAL CELLS, UA: 1 /HPF (ref 0–5)
GLUCOSE URINE: NEGATIVE MG/DL
HCT VFR BLD CALC: 45 % (ref 36–48)
HEMOGLOBIN: 14.8 G/DL (ref 12–16)
HYALINE CASTS: 0 /LPF (ref 0–8)
KETONES, URINE: ABNORMAL MG/DL
LEUKOCYTE ESTERASE, URINE: ABNORMAL
LYMPHOCYTES ABSOLUTE: 1.8 K/UL (ref 1–5.1)
LYMPHOCYTES RELATIVE PERCENT: 26.7 %
MCH RBC QN AUTO: 30.7 PG (ref 26–34)
MCHC RBC AUTO-ENTMCNC: 33 G/DL (ref 31–36)
MCV RBC AUTO: 93 FL (ref 80–100)
MICROSCOPIC EXAMINATION: YES
MONOCYTES ABSOLUTE: 0.4 K/UL (ref 0–1.3)
MONOCYTES RELATIVE PERCENT: 6.6 %
NEUTROPHILS ABSOLUTE: 4.4 K/UL (ref 1.7–7.7)
NEUTROPHILS RELATIVE PERCENT: 65.8 %
NITRITE, URINE: NEGATIVE
PDW BLD-RTO: 12.8 % (ref 12.4–15.4)
PH UA: 7.5 (ref 5–8)
PLATELET # BLD: 270 K/UL (ref 135–450)
PMV BLD AUTO: 9.8 FL (ref 5–10.5)
PROTEIN UA: ABNORMAL MG/DL
RBC # BLD: 4.84 M/UL (ref 4–5.2)
RBC UA: 2 /HPF (ref 0–4)
SPECIFIC GRAVITY UA: 1.03 (ref 1–1.03)
URINE TYPE: ABNORMAL
UROBILINOGEN, URINE: 1 E.U./DL
WBC # BLD: 6.7 K/UL (ref 4–11)
WBC UA: 4 /HPF (ref 0–5)

## 2022-05-13 PROCEDURE — 99214 OFFICE O/P EST MOD 30 MIN: CPT | Performed by: OBSTETRICS & GYNECOLOGY

## 2022-05-13 RX ORDER — ESTRADIOL 1 MG/1
1 TABLET ORAL DAILY
Qty: 10 TABLET | Refills: 0 | Status: SHIPPED | OUTPATIENT
Start: 2022-05-13 | End: 2022-05-24

## 2022-05-13 RX ORDER — KETOROLAC TROMETHAMINE 10 MG/1
10 TABLET, FILM COATED ORAL EVERY 6 HOURS PRN
Qty: 20 TABLET | Refills: 0 | Status: SHIPPED | OUTPATIENT
Start: 2022-05-13 | End: 2022-08-08

## 2022-05-13 ASSESSMENT — PATIENT HEALTH QUESTIONNAIRE - PHQ9
9. THOUGHTS THAT YOU WOULD BE BETTER OFF DEAD, OR OF HURTING YOURSELF: 0
SUM OF ALL RESPONSES TO PHQ QUESTIONS 1-9: 0
SUM OF ALL RESPONSES TO PHQ9 QUESTIONS 1 & 2: 0
6. FEELING BAD ABOUT YOURSELF - OR THAT YOU ARE A FAILURE OR HAVE LET YOURSELF OR YOUR FAMILY DOWN: 0
5. POOR APPETITE OR OVEREATING: 0
SUM OF ALL RESPONSES TO PHQ QUESTIONS 1-9: 0
3. TROUBLE FALLING OR STAYING ASLEEP: 0
7. TROUBLE CONCENTRATING ON THINGS, SUCH AS READING THE NEWSPAPER OR WATCHING TELEVISION: 0
10. IF YOU CHECKED OFF ANY PROBLEMS, HOW DIFFICULT HAVE THESE PROBLEMS MADE IT FOR YOU TO DO YOUR WORK, TAKE CARE OF THINGS AT HOME, OR GET ALONG WITH OTHER PEOPLE: 0
SUM OF ALL RESPONSES TO PHQ QUESTIONS 1-9: 0
1. LITTLE INTEREST OR PLEASURE IN DOING THINGS: 0
8. MOVING OR SPEAKING SO SLOWLY THAT OTHER PEOPLE COULD HAVE NOTICED. OR THE OPPOSITE, BEING SO FIGETY OR RESTLESS THAT YOU HAVE BEEN MOVING AROUND A LOT MORE THAN USUAL: 0
SUM OF ALL RESPONSES TO PHQ QUESTIONS 1-9: 0
4. FEELING TIRED OR HAVING LITTLE ENERGY: 0
2. FEELING DOWN, DEPRESSED OR HOPELESS: 0

## 2022-05-13 NOTE — PROGRESS NOTES
5/13/22    Blaise Chong  1985    Chief Complaint   Patient presents with    Pelvic Pain     Pt c/o right sided tight cramping pelvic pain that radiates to lower back. x 4wks    Vaginal Discharge     Pt c/o black thick discharge with odor x 4 wks    Other     Pt seen at [facility] on 5/8/22 due to pain, they told her she had and ovarian cyst        Blaise Chong is a 40 y.o. female who presents today for evaluation of pelvic pain and discharge. The patient has a complaint of pelvic pain. RLQ. It began 1 month(s). She describes it as achy. It is a 2-7/10 on a pain scale. The patient has   none  Shedenies any UTI signs or symptoms. She denies back or flank pain. There is not associated pain with intercourse. ions. The pain described is not associated with her menstrual cycle. patient admits to of a vaginal discharge. It began 1 month(s) ago. She describes it as moderate. She states it does not have a fishy odor. She describes it as amost black. Past Medical History:   Diagnosis Date    Abnormal Pap smear of cervix     Anxiety     Breast lump in female     Chronic back pain     Depression 7/16/2015    Headache(784.0)     Ovarian cyst     multi    Restless legs syndrome        Past Surgical History:   Procedure Laterality Date    APPENDECTOMY      CYST REMOVAL      head     HERNIA REPAIR      OTHER SURGICAL HISTORY  07/08/2016    bry vila       Social History     Tobacco Use    Smoking status: Never Smoker    Smokeless tobacco: Never Used   Vaping Use    Vaping Use: Never used   Substance Use Topics    Alcohol use:  Yes     Alcohol/week: 0.0 standard drinks     Comment: Socially    Drug use: No       Family History   Problem Relation Age of Onset    Cancer Father         pancreas and lungs     Hypertension Father     Diabetes Father     Heart Disease Father     Heart Attack Father     Coronary Art Dis Father     High Cholesterol Father     Other Mother         autoimmune disease Raynaldo Gravel /liver issues     Anemia Mother     Arthritis Mother     Depression Sister     Clotting Disorder Maternal Grandmother     Cancer Maternal Grandfather     Parkinsonism Paternal Grandfather     Anxiety Disorder Sister        Current Outpatient Medications   Medication Sig Dispense Refill    ketorolac (TORADOL) 10 MG tablet Take 1 tablet by mouth every 6 hours as needed for Pain 20 tablet 0    estradiol (ESTRACE) 1 MG tablet Take 1 tablet by mouth daily for 10 days 10 tablet 0    lisinopril-hydroCHLOROthiazide (PRINZIDE;ZESTORETIC) 10-12.5 MG per tablet Take 1 tablet by mouth daily 30 tablet 3    medroxyPROGESTERone (DEPO-PROVERA) 150 MG/ML injection Inject 1 mL into the muscle every 3 months 1 mL 3    Aspirin-Acetaminophen-Caffeine (EXCEDRIN MIGRAINE PO) Take 2,000 mg by mouth every morning        No current facility-administered medications for this visit. Allergies   Allergen Reactions    Benadryl [Diphenhydramine-Zinc Acetate] Shortness Of Breath and Swelling    Augmentin [Amoxicillin-Pot Clavulanate]      Blurred Vision    Penicillins      Blurred vision        [id number]    Immunization History   Administered Date(s) Administered    COVID-19, [employer] top, DO NOT Dilute, [place]-Sucrose, 12+ yrs, PF, 30 mcg/0.3 mL dose 01/25/2022, 02/15/2022    DTaP 07/12/2014       Review of Systems   Genitourinary: Positive for pelvic pain and vaginal discharge. BP (!) 161/80   Ht 5' 4\" (1.626 m)   Wt 123 lb (55.8 kg)   BMI 21.11 kg/m²     Physical Exam  Exam conducted with a chaperone present. Constitutional:       Appearance: She is normal weight. HENT:      Head: Normocephalic and atraumatic. Nose: Nose normal.   Eyes:      Conjunctiva/sclera: Conjunctivae normal.   Cardiovascular:      Rate and Rhythm: Normal rate. Pulses: Normal pulses. Pulmonary:      Effort: Pulmonary effort is normal.   Abdominal:      General: Abdomen is flat.  Bowel sounds are normal. There is no distension. Palpations: Abdomen is soft. There is no mass. Tenderness: There is no abdominal tenderness. Hernia: No hernia is present. There is no hernia in the left inguinal area or right inguinal area. Genitourinary:     General: Normal vulva. Exam position: Lithotomy position. Labia:         Right: No rash, tenderness or lesion. Left: No rash, tenderness or lesion. Urethra: No prolapse, urethral pain or urethral lesion. Vagina: Normal. No vaginal discharge, erythema, tenderness or lesions. Cervix: No cervical motion tenderness, discharge, friability, lesion, erythema or cervical bleeding. Uterus: Normal. Tender. Adnexa:         Right: Tenderness present. No mass. Left: Tenderness present. No mass. Comments: Dark blood from cervix  Musculoskeletal:      Cervical back: Normal range of motion and neck supple. Lymphadenopathy:      Lower Body: No right inguinal adenopathy. No left inguinal adenopathy. Skin:     General: Skin is warm and dry. Neurological:      General: No focal deficit present. Mental Status: She is alert and oriented to person, place, and time. No results found for this visit on 05/13/22. ASSESSMENT AND PLAN   Diagnosis Orders   1. Chronic female pelvic pain  CBC with Auto Differential    Urinalysis with Microscopic    HCG Qualitative, Serum    ketorolac (TORADOL) 10 MG tablet   2. Vaginal discharge  Vaginal Pathogens Probes *A    US NON OB TRANSVAGINAL   3. Female pelvic inflammatory disorders in diseases classified elsewhere  Vaginal Pathogens Probes *A   4. Irregular menses  CBC with Auto Differential    TSH with Reflex to FT4    US NON OB TRANSVAGINAL    HCG Qualitative, Serum    estradiol (ESTRACE) 1 MG tablet       Return in about 1 week (around 5/20/2022).     Juancarlos Lara MD

## 2022-05-14 LAB
CANDIDA SPECIES, DNA PROBE: NORMAL
GARDNERELLA VAGINALIS, DNA PROBE: NORMAL
HCG QUALITATIVE: NEGATIVE
TRICHOMONAS VAGINALIS DNA: NORMAL
TSH REFLEX FT4: 0.72 UIU/ML (ref 0.27–4.2)

## 2022-05-24 ENCOUNTER — OFFICE VISIT (OUTPATIENT)
Dept: OBGYN | Age: 37
End: 2022-05-24
Payer: COMMERCIAL

## 2022-05-24 VITALS
DIASTOLIC BLOOD PRESSURE: 101 MMHG | SYSTOLIC BLOOD PRESSURE: 157 MMHG | HEIGHT: 64 IN | BODY MASS INDEX: 22.02 KG/M2 | WEIGHT: 129 LBS

## 2022-05-24 DIAGNOSIS — N94.89 PELVIC PAIN SYNDROME: ICD-10-CM

## 2022-05-24 PROCEDURE — 99213 OFFICE O/P EST LOW 20 MIN: CPT | Performed by: OBSTETRICS & GYNECOLOGY

## 2022-05-24 ASSESSMENT — ENCOUNTER SYMPTOMS
EYES NEGATIVE: 1
ALLERGIC/IMMUNOLOGIC NEGATIVE: 1
GASTROINTESTINAL NEGATIVE: 1
RESPIRATORY NEGATIVE: 1

## 2022-05-24 NOTE — PROGRESS NOTES
5/24/22    Sierra Morales  1985    Chief Complaint   Patient presents with    Follow-up     Pt here to f/u on US. Hx of pelvic pain. Pt continues to have pain. States bleeding has stopped. Sierra Morales is a 40 y.o. female who presents today for evaluation of pelvic pain. Pain x 1 month. Left and right sided. Worse with movement (bending,squatting,lifiting). Cramping and dull but sharp at other times. Past Medical History:   Diagnosis Date    Abnormal Pap smear of cervix     Anxiety     Breast lump in female     Chronic back pain     Depression 7/16/2015    Headache(784.0)     Ovarian cyst     multi    Pelvic pain     Restless legs syndrome        Past Surgical History:   Procedure Laterality Date    APPENDECTOMY      CYST REMOVAL      head     HERNIA REPAIR      OTHER SURGICAL HISTORY  07/08/2016    bry vila       Social History     Tobacco Use    Smoking status: Never Smoker    Smokeless tobacco: Never Used   Vaping Use    Vaping Use: Never used   Substance Use Topics    Alcohol use:  Yes     Alcohol/week: 0.0 standard drinks     Comment: Socially    Drug use: No       Family History   Problem Relation Age of Onset    Cancer Father         pancreas and lungs     Hypertension Father     Diabetes Father     Heart Disease Father     Heart Attack Father     Coronary Art Dis Father     High Cholesterol Father     Other Mother         autoimmune disease Kathrene Bath /liver issues     Anemia Mother     Arthritis Mother     Depression Sister     Clotting Disorder Maternal Grandmother     Cancer Maternal Grandfather     Parkinsonism Paternal Grandfather     Anxiety Disorder Sister        Current Outpatient Medications   Medication Sig Dispense Refill    ketorolac (TORADOL) 10 MG tablet Take 1 tablet by mouth every 6 hours as needed for Pain 20 tablet 0    estradiol (ESTRACE) 1 MG tablet Take 1 tablet by mouth daily for 10 days 10 tablet 0    lisinopril-hydroCHLOROthiazide (PRINZIDE;ZESTORETIC) 10-12.5 MG per tablet Take 1 tablet by mouth daily 30 tablet 3    medroxyPROGESTERone (DEPO-PROVERA) 150 MG/ML injection Inject 1 mL into the muscle every 3 months 1 mL 3    Aspirin-Acetaminophen-Caffeine (EXCEDRIN MIGRAINE PO) Take 2,000 mg by mouth every morning        No current facility-administered medications for this visit. Allergies   Allergen Reactions    Benadryl [Diphenhydramine-Zinc Acetate] Shortness Of Breath and Swelling    Augmentin [Amoxicillin-Pot Clavulanate]      Blurred Vision    Penicillins      Blurred vision        [id number]    Immunization History   Administered Date(s) Administered    COVID-Appcelerator, [employer] [employer] top, DO NOT Dilute, [place]-Sucrose, 12+ yrs, PF, 30 mcg/0.3 mL dose 01/25/2022, 02/15/2022    DTaP 07/12/2014       Review of Systems   Constitutional: Negative. HENT: Negative. Eyes: Negative. Respiratory: Negative. Cardiovascular: Negative. Gastrointestinal: Negative. Endocrine: Negative. Genitourinary: Positive for pelvic pain. Musculoskeletal: Negative. Skin: Negative. Allergic/Immunologic: Negative. Neurological: Negative. Hematological: Negative. Psychiatric/Behavioral: Negative. BP (!) 157/101   Ht 5' 4\" (1.626 m)   Wt 129 lb (58.5 kg)   LMP 04/29/2022 (Exact Date)   BMI 22.14 kg/m²     Physical Exam  Constitutional:       General: She is not in acute distress. Appearance: Normal appearance. She is not ill-appearing. HENT:      Head: Normocephalic. Nose: Nose normal.   Eyes:      General: No scleral icterus. Right eye: No discharge. Left eye: No discharge. Cardiovascular:      Pulses: Normal pulses. Pulmonary:      Effort: Pulmonary effort is normal.   Abdominal:      General: Abdomen is flat. There is no distension. Palpations: Abdomen is soft. There is no mass. Tenderness: There is no abdominal tenderness.       Hernia: No hernia is present. Musculoskeletal:         General: Normal range of motion. Cervical back: Normal range of motion and neck supple. No rigidity. Skin:     General: Skin is warm and dry. Neurological:      General: No focal deficit present. Mental Status: She is alert and oriented to person, place, and time. Psychiatric:         Mood and Affect: Mood normal.         Behavior: Behavior normal.       05/13/2022 1001 05/14/2022 0956 Vaginal Pathogens Probes *A [[id number]]    Vaginal    Component Value   Trichomonas Vaginalis DNA Negative DNA not detected. Normal range: Negative DNA not detected. GARDNERELLA VAGINALIS, DNA PROBE Negative DNA not detected. Normal range: Negative DNA not detected. MOISE SPECIES, DNA PROBE Negative DNA not detected. Normal range: Negative DNA not detected.             05/13/2022 0945 05/13/2022 1830 Urinalysis with Microscopic [[id number]]   (Abnormal)   Urine, clean catch    Component Value Units   Color, UA Yellow    Clarity, UA Clear    Glucose, Ur Negative mg/dL   Bilirubin Urine Negative    Ketones, Urine TRACE Abnormal  mg/dL   Specific Gravity, UA 1.028    Blood, Urine Negative    pH, UA 7.5    Protein, UA TRACE Abnormal  mg/dL   Urobilinogen, Urine 1.0 E.U./dL   Nitrite, Urine Negative    Leukocyte Esterase, Urine TRACE Abnormal     Microscopic Examination YES    Urine Type NotGiven    Bacteria, UA None Seen /HPF   Hyaline Casts, UA 0 /LPF   WBC, UA 4 /HPF   RBC, UA 2 /HPF   Epithelial Cells, UA 1  /HPF           05/13/2022 0939 05/14/2022 0146 HCG Qualitative, Serum [[id number]]   Blood    Component Value   hCG Qual Negative           05/13/2022 0939 05/14/2022 0421 TSH with Reflex to FT4 [[id number]]   Blood    Component Value Units   TSH Reflex FT4 0.72 uIU/mL           05/13/2022 0939 05/13/2022 1734 CBC with Auto Differential [[id number]]   Blood    Component Value Units   WBC 6.7 K/uL   RBC 4.84 M/uL   Hemoglobin 14.8 g/dL   Hematocrit 45.0 %   MCV 93.0 fL MCH 30.7 pg   MCHC 33.0 g/dL   RDW 12.8 %   Platelets 785 K/uL   MPV 9.8 fL   Neutrophils % 65.8 %   Lymphocytes % 26.7 %   Monocytes % 6.6 %   Eosinophils % 0.4 %   Basophils % 0.5 %   Neutrophils Absolute 4.4 K/uL   Lymphocytes Absolute 1.8 K/uL   Monocytes Absolute 0.4 K/uL   Eosinophils Absolute 0.0 K/uL   Basophils Absolute 0.0 K/uL             See ultrasound    No results found for this visit on 05/24/22. ASSESSMENT AND PLAN   Diagnosis Orders   1. Pelvic pain       Patient desires to monitor pain. Will consider laparoscopy if it does not improve  Also will see pcp    Reports normal bp at work today  No follow-ups on file.     Aminata Rivera MD

## 2022-06-17 DIAGNOSIS — S91.302A OPEN WOUND OF LEFT FOOT, INITIAL ENCOUNTER: Primary | ICD-10-CM

## 2022-06-17 RX ORDER — CEPHALEXIN 500 MG/1
500 CAPSULE ORAL 2 TIMES DAILY
Qty: 14 CAPSULE | Refills: 0 | Status: SHIPPED | OUTPATIENT
Start: 2022-06-17 | End: 2022-08-08 | Stop reason: ALTCHOICE

## 2022-08-08 ENCOUNTER — OFFICE VISIT (OUTPATIENT)
Dept: NEUROSURGERY | Age: 37
End: 2022-08-08
Payer: COMMERCIAL

## 2022-08-08 VITALS
RESPIRATION RATE: 16 BRPM | WEIGHT: 128.5 LBS | HEART RATE: 66 BPM | OXYGEN SATURATION: 98 % | BODY MASS INDEX: 21.94 KG/M2 | SYSTOLIC BLOOD PRESSURE: 138 MMHG | DIASTOLIC BLOOD PRESSURE: 92 MMHG | HEIGHT: 64 IN

## 2022-08-08 DIAGNOSIS — M54.12 CERVICAL RADICULOPATHY: Primary | ICD-10-CM

## 2022-08-08 DIAGNOSIS — M47.812 CERVICAL SPONDYLOSIS: ICD-10-CM

## 2022-08-08 DIAGNOSIS — G43.009 MIGRAINE WITHOUT AURA AND WITHOUT STATUS MIGRAINOSUS, NOT INTRACTABLE: ICD-10-CM

## 2022-08-08 DIAGNOSIS — M62.838 CERVICAL PARASPINAL MUSCLE SPASM: ICD-10-CM

## 2022-08-08 PROCEDURE — 99203 OFFICE O/P NEW LOW 30 MIN: CPT | Performed by: PHYSICIAN ASSISTANT

## 2022-08-08 RX ORDER — CYCLOBENZAPRINE HCL 5 MG
5 TABLET ORAL 3 TIMES DAILY PRN
Qty: 30 TABLET | Refills: 0 | Status: SHIPPED | OUTPATIENT
Start: 2022-08-08 | End: 2022-08-18

## 2022-08-08 NOTE — PROGRESS NOTES
Neurosurgery Office Note    HPI:  40 y.o. 1985  Who presents today with complaints of neck pain that has been chronic for many years but has acutely worsened over the past 3-5 weeks. 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 She has never had formal PT but does exercises and neck stretches daily. She also worked with a chiropractor for 3-4 years but did not notice any lasting relief from it. Over the past several weeks her pain has become more consistent and severe. With that she experiences headaches that radiate from the base of her skull up the back of her head and into her frontal scalp bilaterally. She states that she had to go to the ER in [facility] last Wednesday to 2 severe pain. She states that she had never had a headache that severe before. They did perform a CT of the head and she states that she was told it was, \"normal\". Unable to see impression of the scan in care everywhere. She reports some left facial numbness that comes and goes as well as numbness and tingling in her left arm and a C5-7 distribution. She denies any numbness in her right arm. She does admit to decreased weakness with her left hand . She has seen Dr. Sera Ignacio who referred her to [facility] for suspected thoracic outlet syndrome. She states that they offered her surgery but she was not interested in that at the time. She does admit to feeling unsteady with ambulation at times as well as with standing still, she feels that she leans to the left. She denies any pain or numbness in her lower extremities, denies urinary urgency or bowel/bladder incontinence. Denies vision changes. She has tried pain medications such as Tylenol, naproxen, cervical epidural steroid injections and cervical trigger point injections, chiropractic therapy all without lasting relief.   She has seen Dr. Cornelius Carmen previously, request not to see him again if possible. Patient is not on any antiplatelets or anticoagulants. PMHx positive for chronic back pain, RLS, depression, migraines, pelvic pain. Past Surgical history positive for appendectomy, hernia repair, cyst removal.                 Past Medical and Surgical History:       Diagnosis Date    Abnormal Pap smear of cervix     Anxiety     Breast lump in female     Chronic back pain     Depression 7/16/2015    Headache(784.0)     Ovarian cyst     multi    Pelvic pain     Restless legs syndrome          Procedure Laterality Date    APPENDECTOMY      CYST REMOVAL      head     HERNIA REPAIR      OTHER SURGICAL HISTORY  07/08/2016    lap appy       Social History:    TOBACCO:   reports that she has never smoked. She has never used smokeless tobacco.  ETOH:   reports current alcohol use. Family History:       Problem Relation Age of Onset    Cancer Father         pancreas and lungs     Hypertension Father     Diabetes Father     Heart Disease Father     Heart Attack Father     Coronary Art Dis Father     High Cholesterol Father     Other Mother         autoimmune disease Nelda Waterford /liver issues     Anemia Mother     Arthritis Mother     Depression Sister     Clotting Disorder Maternal Grandmother     Cancer Maternal Grandfather     Parkinsonism Paternal Grandfather     Anxiety Disorder Sister        Current Medications:    No current facility-administered medications for this visit.     Allergies   Allergen Reactions    Benadryl [Diphenhydramine-Zinc Acetate] Shortness Of Breath and Swelling    Augmentin [Amoxicillin-Pot Clavulanate]      Blurred Vision    Penicillins      Blurred vision         REVIEW OF SYSTEMS:    CONSTITUTIONAL:  negative for fevers, chills, diaphoresis, activity change, appetite change  EYES:  negative for blurred vision, eye discharge, visual disturbance and icterus  HEENT:  negative for hearing loss, tinnitus, ear drainage, sinus pressure, nasal congestion  RESPIRATORY:  No cough, shortness of breath, hemoptysis  GASTROINTESTINAL:  negative for nausea, vomiting, diarrhea, constipation  GENITOURINARY:  negative for frequency, dysuria, urinary incontinence, decreased urine volume, and hematuria  HEMATOLOGIC/LYMPHATIC:  negative for easy bruising, bleeding and lymphadenopathy  MUSCULOSKELETAL: positive for neck pain, negative for joint swelling, decreased range of motion and muscle weakness  NEUROLOGICAL: positive for headaches, negative for slurred speech, unilateral weakness  PSYCHIATRIC/BEHAVIORAL: negative for hallucinations, behavioral problems, confusion and agitation. Objective:   PHYSICAL EXAM:      VITALS:  BP (!) 138/92 (Site: Right Upper Arm, Position: Sitting, Cuff Size: Medium Adult)   Pulse 66   Resp 16   Ht 5' 4\" (1.626 m)   Wt 128 lb 8 oz (58.3 kg)   SpO2 98%   BMI 22.06 kg/m²      24HR INTAKE/OUTPUT:  No intake or output data in the 24 hours ending 08/08/22 1228  CONSTITUTIONAL:  Awake, alert, cooperative, no apparent distress, and appears stated age  HEENT: NCAT, PERRL, EOMI, Sclera white, conjunctive full, tongue protrudes midline  PSYCHIATRIC: Oriented to person place and time. No obvious depression or anxiety. MUSCULOSKELETAL: No obvious misalignment or effusion of the joints. No clubbing, cyanosis of the digits. SKIN:  normal skin color, texture, turgor and no redness, warmth, or swelling. NEUROLOGIC: Alert and oriented x4, face symmetrical, no obvious droop, speech clear and coherent, sensation decreased to light touch and pinprick sensation in left C5-7 distribution, steady gait with ambulation  Upper extremity strength: Bilateral upper extremities 5/5 throughout except for left handgrip which is 4/5 in left interosseous which is 4/5.   No Daryl's bilaterally, no clonus detected  Lower extremity strength: Bilateral lower extremities 5/5 throughout, no clonus detected bilaterally    DATA:    Old records have been reviewed    Radiology Review:  All pertinent images / reports were reviewed as a part of this visit. Cervical AP/LAT and flexion/extension x-rays 8/8/2022  Impression not available yet. On my review patient has straightening of her lordotic curve with slight reversal.  No significant degenerative disc disease noted. Assessment and plan:     1. Cervical radiculopathy  2. Cervical spondylosis  3. Cervical paraspinal muscle spasm  4. Migraine without aura and without status migrainosus, not intractable      Reviewed cervical xrays with the patient. She has straightening of her lordotic curve with slight reversal.  No significant degenerative disc disease. Because of her abnormal alignment, this could be causing some stenosis of her neural foramina causing a left C5-7 radiculopathy. Previously she has had trigger point injections as well as cervical epidural steroid injections with minimal relief. I do believe that trigger point injections with Botox via neurology would significantly help her migraines as well as her muscle spasms. She reports difficulty with sleeping, only got 4 hours of sleep last night. I did prescribe Flexeril 5 mg and advised her to take one tablet at night. She is currently under a lot of stress as she is working full-time and studying for exam and has been in the process of moving. MRI of the cervical spine ordered to rule out DDD and structural pathology within the neural foramen. Next steps: Follow-up with neurology to get cervical and skull base botox injections for muscle relief. Obtain MRI cervical, notify office when imaging becomes available. Electronically signed by Domi Chávez PA-C on 8/8/2022 at 12:28 PM      Supervising physician: Francois Gallardo. Herbie Zamarripa MD.  Dr. Herbie Zamarripa was readily and continuously available by phone for direct consultation regarding the care of this patient.     Time spent with patient in consultation, education, and collaboration with medical time is >50% of total time spent on case, including time spent in chart review and dictation. Total time spent: 30 minutes    Thank you for the opportunity to participate in the care of your patient.

## 2022-08-08 NOTE — PROGRESS NOTES
Patient is a 40 y.o. y.o. female who presents to the office today as a new patient for an evaluation of neck pain. Patient states she has had neck pain for several years and that it has gotten worse the past couple months. She she gets sharp, shooting pains, \"stinger\" and migraines. Pain scale 3/10 today. States she has taken muscle relaxers, NSAIDS, naproxen, Imitrex and caffeine with no relief. States she does have some numbness and tingling in her left hand - the first 3 fingers. Patient has been to chiropractor for numerous adjustments and has had massage therapy. Has also had steroid injections in her neck and a TENS unit. Has not tried PT, but states she does home exercises and stretches daily. Patient has been ambulating without any assistive device.

## 2022-08-16 ENCOUNTER — OFFICE VISIT (OUTPATIENT)
Dept: NEUROLOGY | Age: 37
End: 2022-08-16
Payer: COMMERCIAL

## 2022-08-16 VITALS
SYSTOLIC BLOOD PRESSURE: 132 MMHG | WEIGHT: 127.4 LBS | DIASTOLIC BLOOD PRESSURE: 70 MMHG | HEART RATE: 63 BPM | BODY MASS INDEX: 21.75 KG/M2 | HEIGHT: 64 IN | OXYGEN SATURATION: 99 %

## 2022-08-16 DIAGNOSIS — M54.2 NECK PAIN: ICD-10-CM

## 2022-08-16 DIAGNOSIS — G43.009 MIGRAINE WITHOUT AURA AND WITHOUT STATUS MIGRAINOSUS, NOT INTRACTABLE: Primary | ICD-10-CM

## 2022-08-16 PROCEDURE — 99205 OFFICE O/P NEW HI 60 MIN: CPT | Performed by: PSYCHIATRY & NEUROLOGY

## 2022-08-16 RX ORDER — GALCANEZUMAB 120 MG/ML
120 INJECTION, SOLUTION SUBCUTANEOUS
Qty: 1 PEN | Refills: 11 | Status: SHIPPED | OUTPATIENT
Start: 2022-09-09 | End: 2022-09-28

## 2022-08-16 NOTE — PROGRESS NOTES
8/16/22    Luna Atkinson  1985    Chief Complaint   Patient presents with    Migraine     Pt presents for migraines, pt states her migraines are random throughout the day or all day long and have become worse in the last 2 months, nausea vomiting, numbness in the face, blurred vision are all present with the migraine episodes       History of Present Illness  Erica is a 40 y.o. female presenting today for evaluation of:  Migraines    She states she has had migraines since she was a teenager. They have been worse over the past month. When they are really bad she will have numbness on the side of her head that sometimes goes into her cheek. She does get nauseous and vomits about 5% of the time. She will get sensitivity to lights and sounds. She would get blurry vision. Sometimes she will feel like she is in the \"the twilight zone\". Her most recent was this Saturday. Over the last year she has been having a migraine essentially every week. They are in the back of the head and the forehead. They are throbbing in quality. They can get to a 10 out of 10 in intensity but with Excedrin Migraine they go down to a 7 out of 8 out of 10 in intensity. She states she will have a lingering headache for about 2 days afterwards. She has had some stress. She moved about a month ago. 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 She is doing this at [address] [place] [address]. 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 For her migraine she has tried Maxalt and Imitrex in the past which were not helpful and increase her nausea and vomiting caused her to have sweats. She tried Nurtec ODT which was not helpful. She does have bad neck pain. She states it is always sore. Is particularly tender on the left. She does yoga every day. She does massage therapy once per month.   She was doing chiropractics but she stopped this. She does have numbness of the first 3 fingers on her left hand. She was told this is from thoracic outlet syndrome. She was offered surgery to remove her first rib she tells me but she did not want to pursue that. She tells me that her grandfather has bad migraines and he has a vagal nerve stimulator. For migraine she has tried Flexeril which has helped with sleep but not pain. She tried amitriptyline which was not helpful. She has tried gabapentin which helped with her neck pain and paresthesias but did not help with her migraines. Subjective    Review of Symptoms:  Neurologic   Symptoms: sensory disturbances, headaches, no difficulty with gait or walking, no bowel symptoms, no vertigo, no confusion, no memory loss, no speech disorder, no visual loss, no double vision, no dizziness, no loss of hearing, no weakness, no bladder symptoms, no seizures, no excessive fatigue, and no syncope    Current Outpatient Medications   Medication Sig Dispense Refill    [START ON 9/9/2022] Galcanezumab-gnlm (EMGALITY) 120 MG/ML SOAJ Inject 120 mg into the skin every 30 days Do not fill until after 1st month's dose 1 pen 11    cyclobenzaprine (FLEXERIL) 5 MG tablet Take 1 tablet by mouth 3 times daily as needed for Muscle spasms 30 tablet 0    medroxyPROGESTERone (DEPO-PROVERA) 150 MG/ML injection Inject 1 mL into the muscle every 3 months 1 mL 3     No current facility-administered medications for this visit.        Past Medical History:   Diagnosis Date    Abnormal Pap smear of cervix     Anxiety     Breast lump in female     Chronic back pain     Depression 7/16/2015    Headache(784.0)     Ovarian cyst     multi    Pelvic pain     Restless legs syndrome        Past Surgical History:   Procedure Laterality Date    APPENDECTOMY      CYST REMOVAL      head     HERNIA REPAIR      OTHER SURGICAL HISTORY  07/08/2016    bry appy        Social History     Socioeconomic History    Marital status:  Spouse name: None    Number of children: None    Years of education: None    Highest education level: None   Tobacco Use    Smoking status: Never    Smokeless tobacco: Never   Vaping Use    Vaping Use: Never used   Substance and Sexual Activity    Alcohol use:  Yes     Alcohol/week: 0.0 standard drinks     Comment: Socially    Drug use: No    Sexual activity: Yes     Social Determinants of Health     Financial Resource Strain: Low Risk     Difficulty of Paying Living Expenses: Not hard at all   Food Insecurity: No Food Insecurity    Worried About Running Out of Food in the Last Year: Never true    Ran Out of Food in the Last Year: Never true       Family History   Problem Relation Age of Onset    Cancer Father         pancreas and lungs     Hypertension Father     Diabetes Father     Heart Disease Father     Heart Attack Father     Coronary Art Dis Father     High Cholesterol Father     Other Mother         autoimmune disease Gennett Shouts /liver issues     Anemia Mother     Arthritis Mother     Depression Sister     Clotting Disorder Maternal Grandmother     Cancer Maternal Grandfather     Parkinsonism Paternal Grandfather     Anxiety Disorder Sister        Objective    Physical Exam:    Constitutional   Weight: well nourished  Heart/Vascular   Rate and Rhythm: RRR   Murmurs: none   Arterial Pulses:  no carotid bruits  Neck   Appearance/Palpation/Auscultation: supple  Mental Status   Orientation: oriented to person, oriented to place, oriented to problem, and oriented to time   Mood/Affect: appropriate mood and appropriate affect   Memory/Other: recent memory intact, remote memory intact, fund of knowledge intact, attention span normal, and concentration normal  Language   Language: (normal) language, no dysarthria, (normal) articulation, and no dysphasia/aphasia  Cranial Nerves   CN II Right: visual fields appear intact   CN II Left: visual fields appear intact   CN III, IV, VI: EOM no nystagmus, normal pursuit, and extraocular muscle strength normal   CN III: pupil normal size, pupil reactive to light and dark, pupil accomodates, and no ptosis   CN IV: normal   CN VI: normal   CN V Right: normal sensation and muscles of mastication intact   CN V Left: normal sensation and muscles of mastication intact   CN VII Right: normal facial expression   CN VII Left: normal facial expression   CN VIII Right: hearing in tact to normal conversation   CN VIII Left: hearing in tact to normal conversation   CN IX,X: normal palatal movement   CN XI Right: normal sternocleidomastoid and normal trapezius   CN XI Left: normal sternocleidomastoid and normal trapezius   CN XII: no tremors of the tongue, no fasciculation of the tongue, tongue protrudes midline, normal power to left, and normal power to right  Gait and Stance   Gait/Posture: station normal, ambulates independently, gait normal, tandem gait normal, and Romberg's test normal  Motor/Coordination Exam   General: no bradykinesia, no tremors, no chorea, no athetosis, no myoclonus, and no dyskinesia   Right Upper Extremity: normal motor strength, normal bulk, and normal tone   Left Upper Extremity: normal motor strength, normal bulk, and normal tone   Right Lower Extremity: normal motor strength, normal bulk, and normal tone   Left Lower Extremity: normal motor strength, normal bulk, and normal tone   Coordination: no drift, normal finger-to-nose, and rapid alternating movements normal  Reflexes   Reflexes Right: DTRS are normal throughout   Reflexes Left: DTRS are normal throughout   Plantar Reflex Right: response downgoing   Plantar Reflex Left: response downgoing   Hoffmans Reflex Right: absent   Hoffmans Reflex Left: absent  Sensory   Sensation: normal light touch, normal temperature, normal vibration, and no neglect  Spine   Cervical Spine: no tenderness, no dystonia , and full ROM   Thoracic Spine: no spasms, no bony abnormalities, normal curvature, no tenderness, and full ROM   Low Back: full ROM, no pain, no spasms, and no bony abnormalities  Lungs   Auscultation: normal breath sounds  Skin   Inspection: no jaundice, no lesions, no rashes, and no cyanosis      /70 (Site: Left Upper Arm, Position: Sitting, Cuff Size: Medium Adult)   Pulse 63   Ht 5' 4\" (1.626 m)   Wt 127 lb 6.4 oz (57.8 kg)   SpO2 99%   BMI 21.87 kg/m²     Assessment and Plan     Diagnosis Orders   1. Migraine without aura and without status migrainosus, not intractable  Galcanezumab-gnlm (EMGALITY) 120 MG/ML SOAJ      2. Neck pain            Lia has frequent episodic migraines. Much of her migraines are likely cervicogenic in nature due to her significant left-sided paraspinal and trapezius tenderness. Recommendations are as follows:  -- She has trialed preventative strategies with Flexeril, amitriptyline, and gabapentin without efficacy. We will trial once a month injectable CGRP inhibitor Emgality  -- From an abortive standpoint she did not have efficacy with sumatriptan, rizatriptan, or Nurtec ODT. I will give her samples of Ubrelvy and he Elyxyb to trial to see if either of these may provide her with relief. -- Her primary care office has ordered an MRI of her cervical spine given her significant neck pain and paraspinal muscle discomfort. -- She will continue with medical massage and yoga/stretching for her neck pain. -- We discussed the importance of keeping a detailed headache diary. We discussed trying to identify headache triggers. We discussed the importance of staying well hydrated, eating three regular meals each day, getting plenty of hours of fitful sleep, and reducing stress to help prevent headaches. -- Medications prescribed for the patient were discussed in detail. This included a discussion of the potential risks vs the potential benefits of the medications. The patient was given time to ask questions and these were answered to the best of my ability.  The patient appeared to understand the information provided. Return in about 4 months (around 12/16/2022) for Follow-up with Dr. Vandana Roberts,

## 2022-08-23 RX ORDER — UBROGEPANT 100 MG/1
TABLET ORAL
Qty: 2 TABLET | Refills: 0 | COMMUNITY
Start: 2022-08-23 | End: 2022-09-28

## 2022-08-23 RX ORDER — GALCANEZUMAB 120 MG/ML
INJECTION, SOLUTION SUBCUTANEOUS
Qty: 120 ML | Refills: 0 | COMMUNITY
Start: 2022-08-23 | End: 2022-09-28

## 2022-08-23 RX ORDER — CELECOXIB 120 MG/4.8ML
LIQUID ORAL
Qty: 4.8 ML | Refills: 0 | COMMUNITY
Start: 2022-08-23 | End: 2022-09-28

## 2022-09-28 ENCOUNTER — HOSPITAL ENCOUNTER (EMERGENCY)
Age: 37
Discharge: HOME | End: 2022-09-28
Attending: EMERGENCY MEDICINE
Payer: COMMERCIAL

## 2022-09-28 VITALS
BODY MASS INDEX: 21.68 KG/M2 | SYSTOLIC BLOOD PRESSURE: 181 MMHG | DIASTOLIC BLOOD PRESSURE: 102 MMHG | TEMPERATURE: 97.9 F | HEART RATE: 69 BPM | HEIGHT: 64 IN | OXYGEN SATURATION: 100 % | WEIGHT: 127 LBS | RESPIRATION RATE: 14 BRPM

## 2022-09-28 DIAGNOSIS — I10 ESSENTIAL HYPERTENSION: ICD-10-CM

## 2022-09-28 DIAGNOSIS — R53.1 GENERALIZED WEAKNESS: Primary | ICD-10-CM

## 2022-09-28 DIAGNOSIS — J06.9 ACUTE UPPER RESPIRATORY INFECTION: ICD-10-CM

## 2022-09-28 LAB
ADENOVIRUS DETECTION BY PCR: NOT DETECTED
BORDETELLA PARAPERTUSSIS BY PCR: NOT DETECTED
BORDETELLA PERTUSSIS PCR: NOT DETECTED
CHLAMYDOPHILA PNEUMONIA PCR: NOT DETECTED
CORONAVIRUS 229E PCR: NOT DETECTED
CORONAVIRUS HKU1 PCR: NOT DETECTED
CORONAVIRUS NL63 PCR: NOT DETECTED
CORONAVIRUS OC43 PCR: NOT DETECTED
HUMAN METAPNEUMOVIRUS PCR: NOT DETECTED
INFLUENZA A BY PCR: NOT DETECTED
INFLUENZA A H1 (2009) PCR: NOT DETECTED
INFLUENZA A H1 PANDEMIC PCR: NOT DETECTED
INFLUENZA A H3 PCR: NOT DETECTED
INFLUENZA B BY PCR: NOT DETECTED
MYCOPLASMA PNEUMONIAE PCR: NOT DETECTED
PARAINFLUENZA 1 PCR: NOT DETECTED
PARAINFLUENZA 2 PCR: NOT DETECTED
PARAINFLUENZA 3 PCR: NOT DETECTED
PARAINFLUENZA 4 PCR: NOT DETECTED
RHINOVIRUS ENTEROVIRUS PCR: NOT DETECTED
RSV PCR: NOT DETECTED
SARS-COV-2: NOT DETECTED

## 2022-09-28 PROCEDURE — 99283 EMERGENCY DEPT VISIT LOW MDM: CPT

## 2022-09-28 PROCEDURE — 0202U NFCT DS 22 TRGT SARS-COV-2: CPT

## 2022-09-28 PROCEDURE — 6370000000 HC RX 637 (ALT 250 FOR IP): Performed by: EMERGENCY MEDICINE

## 2022-09-28 RX ORDER — ONDANSETRON 4 MG/1
4 TABLET, ORALLY DISINTEGRATING ORAL ONCE
Status: COMPLETED | OUTPATIENT
Start: 2022-09-28 | End: 2022-09-28

## 2022-09-28 RX ORDER — MEDROXYPROGESTERONE ACETATE 150 MG/ML
INJECTION, SUSPENSION INTRAMUSCULAR
COMMUNITY
Start: 2022-08-11

## 2022-09-28 RX ADMIN — ONDANSETRON 4 MG: 4 TABLET, ORALLY DISINTEGRATING ORAL at 09:30

## 2022-09-28 ASSESSMENT — PAIN DESCRIPTION - DESCRIPTORS: DESCRIPTORS: ACHING

## 2022-09-28 ASSESSMENT — PAIN DESCRIPTION - LOCATION: LOCATION: GENERALIZED

## 2022-09-28 ASSESSMENT — PAIN - FUNCTIONAL ASSESSMENT: PAIN_FUNCTIONAL_ASSESSMENT: 0-10

## 2022-09-28 ASSESSMENT — PAIN SCALES - GENERAL: PAINLEVEL_OUTOF10: 5

## 2022-09-28 NOTE — ED PROVIDER NOTES
[address] [id number]      Pt Name: Lobo Lara  MRN: [id number]  [place] 1985  Date of evaluation: 9/28/2022  Provider: Lyubov Michele, [address] [place] [address]       Chief Complaint   Patient presents with    Fatigue     C/o fatigue, chills, sore throat, headache, bilateral ear ache since Sunday. Patient states she had a few episodes of vomiting Sunday and Monday, tempeture 99 in yesterday. Patient has miri taking Excedrin. HISTORY OF PRESENT ILLNESS   (Location/Symptom, Timing/Onset, Context/Setting, Quality, Duration, Modifying Factors, Severity)  Note limiting factors. Lobo Lara is a 40 y.o. female who presents to the emergency department with a chief complaint of headache fatigue nausea vomiting diarrhea low-grade fever onset Sunday no recent trips or travels have a history of high blood pressure she is on Depo-Provera history of having migraines no other aggravating associated relieving signs or symptoms gradual onset 3-day duration    HPI    Nursing Notes were reviewed. REVIEW OF SYSTEMS    (2-9 systems for level 4, 10 or more for level 5)     Review of Systems  Headache fatigue nausea vomiting diarrhea low-grade fever elevated blood pressure 10 systems reviewed is otherwise negative  Except as noted above the remainder of the review of systems was reviewed and negative.        PAST MEDICAL HISTORY     Past Medical History:   Diagnosis Date    Abnormal Pap smear of cervix     Anxiety     Breast lump in female     Chronic back pain     Depression 7/16/2015    Headache(784.0)     Ovarian cyst     multi    Pelvic pain     Restless legs syndrome          SURGICAL HISTORY       Past Surgical History:   Procedure Laterality Date    APPENDECTOMY      CYST REMOVAL      head     HERNIA REPAIR      OTHER SURGICAL HISTORY  07/08/2016    bry vila         CURRENT MEDICATIONS       Previous Medications    MEDROXYPROGESTERONE (DEPO-PROVERA) 150 MG/ML INJECTION    INJECT 1 ML INTO THE MUSCLE EVERY 3 MONTHS       ALLERGIES     Benadryl [diphenhydramine-zinc acetate], Augmentin [amoxicillin-pot clavulanate], and Penicillins    FAMILY HISTORY       Family History   Problem Relation Age of Onset    Cancer Father         pancreas and lungs     Hypertension Father     Diabetes Father     Heart Disease Father     Heart Attack Father     Coronary Art Dis Father     High Cholesterol Father     Other Mother         autoimmune disease [place] /liver issues     Anemia Mother     Arthritis Mother     Depression Sister     Clotting Disorder Maternal Grandmother     Cancer Maternal Grandfather     Parkinsonism Paternal Grandfather     Anxiety Disorder Sister           SOCIAL HISTORY       Social History     Socioeconomic History    Marital status:      Spouse name: None    Number of children: None    Years of education: None    Highest education level: None   Tobacco Use    Smoking status: Never    Smokeless tobacco: Never   Vaping Use    Vaping Use: Never used   Substance and Sexual Activity    Alcohol use: Yes     Comment: Socially - weekends    Drug use: No    Sexual activity: Yes     Social Determinants of Health     Financial Resource Strain: Low Risk     Difficulty of Paying Living Expenses: Not hard at all   Food Insecurity: No Food Insecurity    Worried About Running Out of Food in the Last Year: Never true    Ran Out of Food in the Last Year: Never true       SCREENINGS                               CIWA Assessment  BP: (!) 181/102  Heart Rate: 69                 PHYSICAL EXAM    (up to 7 for level 4, 8 or more for level 5)     ED Triage Vitals [09/28/22 0912]   BP Temp Temp Source Heart Rate Resp SpO2 Height Weight   (!) 191/101 97.9 °F (36.6 °C) Oral 71 14 100 % 5' 4\" (1.626 m) 127 lb (57.6 kg)       Physical Exam  Constitutional: Patient is awake alert oriented to person place and time. In no acute distress. Well nourished, non-toxic appearance. HENT:  Head is normocephalic and atraumatic. Neck:  Supple full ROM   Eyes:  pupils are equally round reactive light extraoccular motor are intact there's no injection no icterus, Conjunctiva normal, no discharge. Respiratory:  Lungs are clear no increased work of breathing noted  Cardiovascular:  HRRR  GI:  Abdomen does not appear distended   Musculoskeletal:  Moving all 4 extremities with ease  Integument:  Warm, dry there are no lacerations no abscess identified. Neurologic:  Alert & oriented X4  with no focal deficits noted. Psychiatric:  Affect appropriate for the situation no homicidal or suicidal thoughts    DIAGNOSTIC RESULTS     EKG: All EKG's are interpreted by the Emergency Department Physician who either signs or Co-signs this chart in the absence of a cardiologist.        RADIOLOGY:   Non-plain film images such as CT, Ultrasound and MRI are read by the radiologist. Plain radiographic images are visualized and preliminarily interpreted by the emergency physician with the below findings:      Interpretation per the Radiologist below, if available at the time of this note:    No orders to display         ED BEDSIDE ULTRASOUND:   Performed by ED Physician - none    LABS:  Labs Reviewed   RESPIRATORY PANEL, MOLECULAR, WITH COVID-19       All other labs were within normal range or not returned as of this dictation.     EMERGENCY DEPARTMENT COURSE and DIFFERENTIAL DIAGNOSIS/MDM:   Vitals:    Vitals:    09/28/22 0912 09/28/22 1051   BP: (!) 191/101 (!) 181/102   Pulse: 71 69   Resp: 14 14   Temp: 97.9 °F (36.6 °C)    TempSrc: Oral    SpO2: 100% 100%   Weight: 127 lb (57.6 kg)    Height: 5' 4\" (1.626 m)        RPP panel was negative she is feeling better she does not wish to have any prescriptions at this time recommend to follow-up with primary care physician regarding her blood pressure returns any changes or concerns she states that her blood pressure is normally not this high    [facility]        REASSESSMENT          CRITICAL CARE TIME   Total Critical Care time was 0 minutes, excluding separately reportable procedures. There was a high probability of clinically significant/life threatening deterioration in the patient's condition which required my urgent intervention. CONSULTS:  None    PROCEDURES:  Unless otherwise noted below, none     Procedures        FINAL IMPRESSION      1. Generalized weakness    2. Acute upper respiratory infection    3. Essential hypertension          DISPOSITION/PLAN   DISPOSITION Decision To Discharge 09/28/2022 10:53:09 AM      PATIENT REFERRED TO:  Angeles Navarro MD  [facility] [phone]    Schedule an appointment as soon as possible for a visit in 3 days      [facility] Emergency Department  [address] [facility]  [phone]    If symptoms worsen    DISCHARGE MEDICATIONS:  New Prescriptions    No medications on file     Controlled Substances Monitoring:     RX Monitoring 7/16/2015   Attestation The Prescription Monitoring Report for this patient was reviewed today. Periodic Controlled Substance Monitoring No signs of potential drug abuse or diversion identified.        (Please note that portions of this note were completed with a voice recognition program.  Efforts were made to edit the dictations but occasionally words are mis-transcribed.)    Brenda Johnson DO (electronically signed)  Attending Emergency Physician            Brenda Johnson DO  09/28/22 105

## 2022-09-29 ENCOUNTER — OFFICE VISIT (OUTPATIENT)
Dept: INTERNAL MEDICINE CLINIC | Age: 37
End: 2022-09-29
Payer: COMMERCIAL

## 2022-09-29 VITALS
SYSTOLIC BLOOD PRESSURE: 122 MMHG | BODY MASS INDEX: 21.68 KG/M2 | HEIGHT: 64 IN | DIASTOLIC BLOOD PRESSURE: 84 MMHG | HEART RATE: 104 BPM | WEIGHT: 127 LBS | OXYGEN SATURATION: 99 % | TEMPERATURE: 98.4 F

## 2022-09-29 DIAGNOSIS — J40 BRONCHITIS: Primary | ICD-10-CM

## 2022-09-29 PROCEDURE — 99213 OFFICE O/P EST LOW 20 MIN: CPT | Performed by: NURSE PRACTITIONER

## 2022-09-29 RX ORDER — AZITHROMYCIN 250 MG/1
TABLET, FILM COATED ORAL
Qty: 1 PACKET | Refills: 0 | Status: SHIPPED | OUTPATIENT
Start: 2022-09-29 | End: 2022-10-14

## 2022-09-29 RX ORDER — BENZONATATE 100 MG/1
100 CAPSULE ORAL 3 TIMES DAILY PRN
Qty: 20 CAPSULE | Refills: 0 | Status: SHIPPED | OUTPATIENT
Start: 2022-09-29 | End: 2022-10-14 | Stop reason: ALTCHOICE

## 2022-09-29 NOTE — LETTER
[address] [place] [facility]  [facility]  Phone: [phone]  Fax: [phone]    MAINOR Soni CNP        September 29, 2022     Patient: Talita Abad   YOB: 1985   Date of Visit: 9/29/2022       To Whom it May Concern:    Talita Abad was seen in my clinic on 9/29/2022. She may return to work on 10/3/2022. If you have any questions or concerns, please don't hesitate to call.     Sincerely,         MAINOR Soni CNP

## 2022-09-29 NOTE — PROGRESS NOTES
9/29/22  Ferd Left  1985    FLU/COVID-19 CLINIC EVALUATION    HPI SYMPTOMS:    Employer:    [x] Fevers  [x] Chills  [x] Cough  [] Coughing up blood  [x] Chest Congestion  [x] Nasal Congestion  [x] Feeling short of breath  [x] Sometimes  [] Frequently  [] All the time  [] Chest pain  [x] Headaches  [x]Tolerable  [] Severe  [x] Sore throat  [x] Muscle aches  [x] Nausea  [x] Vomiting  []Unable to keep fluids down  [x] Diarrhea  []Severe    [x] OTHER SYMPTOMS:  Fatigue    Symptom Duration:   [] 1  [] 2   [] 3   [] 4    [x] 5   [] 6   [] 7   [] 8   [] 9   [] 10   [] 11   [] 12   [] 13   [] 14   [] Longer than 14 days    Symptom course:   [] Worsening     [] Stable     [x] Improving    RISK FACTORS:    [] Pregnant or possibly pregnant  [] Age over 61  [] Diabetes  [] Heart disease  [] Asthma  [] COPD/Other chronic lung diseases  [] Active Cancer  [] On Chemotherapy  [] Taking oral steroids  [] History Lymphoma/Leukemia  [] Close contact with a lab confirmed COVID-19 patient within 14 days of symptom onset  [] History of travel from affected geographical areas within 14 days of symptom onset       VITALS:  There were no vitals filed for this visit. TESTS:    POCT FLU:  [] Positive     []Negative    ASSESSMENT:    [] Flu  [] Possible COVID-19  [] Strep    PLAN:    [] Discharge home with written instructions for:  [] Flu management  [] Possible COVID-19 infection with self-quarantine and management of symptoms  [] Follow-up with primary care physician or emergency department if worsens  [] Evaluation per physician/NP/PA in clinic  [] Sent to ER       An  electronic signature was used to authenticate this note.      --MAINOR Clark - CNP on 9/29/2022 at 8:23 AM

## 2022-09-29 NOTE — PROGRESS NOTES
9/29/2022    HPI:  Chief complaint and history of present illness as per medical assistant/nurse documented today in the Flu/COVID-19 clinic. Patient is here with complaints of fever/chills, cough, chest/nasal congestion, SOB sometimes, headache, sore throat, muscles aches, nausea/vomiting/diarrhea - resolved, and fatigue x 5 days. Patient states she went to the ER yesterday. Patient had a respiratory panel with covid done. Negative for everything in the respiratory panel. Patient states she has had her covd vaccine. MEDICATIONS:  Prior to Visit Medications    Medication Sig Taking? Authorizing Provider   azithromycin (ZITHROMAX) 250 MG tablet Take 2 tabs (500 mg) on Day 1, and take 1 tab (250 mg) on days 2 through 5.  Yes MAINOR Hudson CNP   benzonatate (TESSALON PERLES) 100 MG capsule Take 1 capsule by mouth 3 times daily as needed for Cough Yes MAINOR Hudson CNP   medroxyPROGESTERone (DEPO-PROVERA) 150 MG/ML injection INJECT 1 ML INTO THE MUSCLE EVERY 3 MONTHS  Historical Provider, MD       Allergies   Allergen Reactions    Benadryl [Diphenhydramine-Zinc Acetate] Shortness Of Breath and Swelling    Augmentin [Amoxicillin-Pot Clavulanate]      Blurred Vision    Penicillins      Blurred vision    ,   Past Medical History:   Diagnosis Date    Abnormal Pap smear of cervix     Anxiety     Breast lump in female     Chronic back pain     Depression 7/16/2015    Headache(784.0)     Ovarian cyst     multi    Pelvic pain     Restless legs syndrome    ,   Past Surgical History:   Procedure Laterality Date    APPENDECTOMY      CYST REMOVAL      head     HERNIA REPAIR      OTHER SURGICAL HISTORY  07/08/2016    bry vila   ,   Social History     Tobacco Use    Smoking status: Never    Smokeless tobacco: Never   Vaping Use    Vaping Use: Never used   Substance Use Topics    Alcohol use: Yes     Comment: Socially - weekends    Drug use: No   ,   Family History   Problem Relation Age of Onset    Cancer Father pancreas and lungs     Hypertension Father     Diabetes Father     Heart Disease Father     Heart Attack Father     Coronary Art Dis Father     High Cholesterol Father     Other Mother         autoimmune disease [place] /liver issues     Anemia Mother     Arthritis Mother     Depression Sister     Clotting Disorder Maternal Grandmother     Cancer Maternal Grandfather     Parkinsonism Paternal Grandfather     Anxiety Disorder Sister    ,   Immunization History   Administered Date(s) Administered    COVID-19, PFIZER GRAY top, DO NOT Dilute, (age 15 y+), IM, 27 mcg/0.3 mL 01/25/2022, 02/15/2022    DTaP 07/12/2014   ,   Health Maintenance   Topic Date Due    Varicella vaccine (1 of 2 - 2-dose childhood series) Never done    HIV screen  Never done    Hepatitis C screen  Never done    COVID-19 Vaccine (3 - Booster for Michaels Peter series) 07/15/2022    Flu vaccine (1) Never done    Depression Monitoring  05/13/2023    Cervical cancer screen  11/15/2026    DTaP/Tdap/Td vaccine (3 - Td or Tdap) 09/18/2029    Hepatitis A vaccine  Aged Out    Hepatitis B vaccine  Aged Out    Hib vaccine  Aged Out    Meningococcal (ACWY) vaccine  Aged Out    Pneumococcal 0-64 years Vaccine  Aged Out       PHYSICAL EXAM:  Physical Exam  Constitutional:       Appearance: Normal appearance. HENT:      Head: Normocephalic. Right Ear: Tympanic membrane, ear canal and external ear normal.      Left Ear: Tympanic membrane, ear canal and external ear normal.      Mouth/Throat:      Lips: Pink. Mouth: Mucous membranes are moist.      Pharynx: Oropharynx is clear. Cardiovascular:      Rate and Rhythm: Normal rate and regular rhythm. Heart sounds: Normal heart sounds. Pulmonary:      Effort: Pulmonary effort is normal.      Breath sounds: Normal breath sounds. Musculoskeletal:      Cervical back: Neck supple. Skin:     General: Skin is warm and dry.    Neurological:      Mental Status: She is alert and oriented to person, place, and time. Psychiatric:         Mood and Affect: Mood normal.         Behavior: Behavior normal.       ASSESSMENT/PLAN:  1. Bronchitis  Encourage clear fluids without caffeine  - Smaller, more frequent meals to ensure hydration.   - Saline nasal spray, cool mist humidifier, prop head at night for congestion.   - May use spoonfuls of honey to coat throat. - Tylenol as needed for fever, pain. - Counseled on signs of increased work of breathing.   - RTO if sxs increase or no improvement  - azithromycin (ZITHROMAX) 250 MG tablet; Take 2 tabs (500 mg) on Day 1, and take 1 tab (250 mg) on days 2 through 5. Dispense: 1 packet; Refill: 0  - benzonatate (TESSALON PERLES) 100 MG capsule; Take 1 capsule by mouth 3 times daily as needed for Cough  Dispense: 20 capsule; Refill: 0      FOLLOW-UP:  Return if symptoms worsen or fail to improve.     In addition to other information, the printed after visit summary provided to the patient includes:  [] COVID-19 Self care instructions  [] COVID-19 General patient information

## 2022-10-14 ENCOUNTER — OFFICE VISIT (OUTPATIENT)
Dept: INTERNAL MEDICINE CLINIC | Age: 37
End: 2022-10-14
Payer: COMMERCIAL

## 2022-10-14 VITALS
BODY MASS INDEX: 20.66 KG/M2 | HEART RATE: 86 BPM | SYSTOLIC BLOOD PRESSURE: 158 MMHG | DIASTOLIC BLOOD PRESSURE: 96 MMHG | WEIGHT: 121 LBS | HEIGHT: 64 IN | OXYGEN SATURATION: 100 % | RESPIRATION RATE: 18 BRPM

## 2022-10-14 DIAGNOSIS — E78.5 DYSLIPIDEMIA: ICD-10-CM

## 2022-10-14 DIAGNOSIS — R00.2 PALPITATIONS: ICD-10-CM

## 2022-10-14 DIAGNOSIS — E55.9 VITAMIN D DEFICIENCY: ICD-10-CM

## 2022-10-14 DIAGNOSIS — I10 HYPERTENSION, UNSPECIFIED TYPE: Primary | ICD-10-CM

## 2022-10-14 DIAGNOSIS — R23.2 HOT FLASHES: ICD-10-CM

## 2022-10-14 DIAGNOSIS — R61 NIGHT SWEATS: ICD-10-CM

## 2022-10-14 DIAGNOSIS — F41.9 ANXIETY: ICD-10-CM

## 2022-10-14 PROCEDURE — 99215 OFFICE O/P EST HI 40 MIN: CPT | Performed by: INTERNAL MEDICINE

## 2022-10-14 RX ORDER — BUSPIRONE HYDROCHLORIDE 5 MG/1
5 TABLET ORAL 2 TIMES DAILY PRN
Qty: 60 TABLET | Refills: 1 | Status: SHIPPED | OUTPATIENT
Start: 2022-10-14 | End: 2022-12-13

## 2022-10-14 RX ORDER — CITALOPRAM 10 MG/1
10 TABLET ORAL DAILY
Qty: 30 TABLET | Refills: 1 | Status: SHIPPED | OUTPATIENT
Start: 2022-10-14

## 2022-10-14 SDOH — HEALTH STABILITY: PHYSICAL HEALTH: ON AVERAGE, HOW MANY MINUTES DO YOU ENGAGE IN EXERCISE AT THIS LEVEL?: 20 MIN

## 2022-10-14 SDOH — HEALTH STABILITY: PHYSICAL HEALTH: ON AVERAGE, HOW MANY DAYS PER WEEK DO YOU ENGAGE IN MODERATE TO STRENUOUS EXERCISE (LIKE A BRISK WALK)?: 5 DAYS

## 2022-10-14 NOTE — PROGRESS NOTES
Patient was in er 2 weeks ago with high bp. She has been having headaches, dizziness and blurred vision. Patient thinks its her anxiety. She is a full time student. 144/92 this morning.

## 2022-10-14 NOTE — PROGRESS NOTES
10/14/22    Pita Angel  1985    Chief Complaint   Patient presents with    Established New Doctor       History of Present Illness:  Pita Angel is a 40 y.o. pleasant lady presenting today to establish care as a new patient with a chief complaint of elevated BP, anxiety. She has a past medical history significant for:  Thoracic outlet syndrome L  Migraines   ? Fibromyalgia  H/o abnormal pap smear -- normal pap and HPV (11/2021)  Fibroids  Ovarian cyst   S/p appendectomy   S/p hernia repair   Never smoker   Binge drink (weekends)     # Patient with elevated BP. BP at home/work has been 120's-160's/80's-90's. Her BP fluctuates. When her BP is elevated, she feels light-headedness, pre-syncopal, palpitations, facial flushing, sweats. This occurs several times daily. More frequently over the past week. These episodes have been occurring over the past 1 year. Patient has attributed this to anxiety. She was prescribed Lisinopril/HCTZ and took it for 3 weeks only as she saw OBGYN and was told this is hormonal so she stopped it. She has ovarian cyst.   BP today 172/110, repeat still elevated BUE. She is physically active. Does yoga and feels that this helps. She has been on  Ativan in the past but did not tolerate. # Sees Neurology for migraines. Maxalt, Imitrex, Flexeril, Amitriptyline, Gabapentin have not helped. Planning for injectable CGRPi Emgality.      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       Health maintenance:   Health Maintenance Due   Topic Date Due    Varicella vaccine (1 of 2 - 2-dose childhood series) Never done    HIV screen  Never done    Hepatitis C screen  Never done    COVID-19 Vaccine (3 - Booster for Pfizer series) 07/15/2022    Flu vaccine (1) Never done         Review of Systems:  Constitutional: no fevers, no chills, night sweats, no change in weight  Pain assessment: no pain  Head: no headaches  Ears: no hearing loss, no tinnitus, no vertigo  Eyes: no blurry vision, no diplopia, no dryness, no itchiness  Mouth: no oral ulcers, no dry mouth, no sore throat  Nose: no nasal congestion, no epistaxis  Cardiac: palpitations, chest tightness with inspiration, no leg swelling, no orthopnea, no PND, no syncope  Pulmonary: no dyspnea, no cough, no wheezing, no hemoptysis  GI: no nausea, no vomiting, no diarrhea, no constipation, no abdominal pain, no hematochezia  : no dysuria, no frequency, no urgency, no hematuria, no frothy urine, no dyspareunia, no pelvic pain, no vaginal bleeding, no abnormal vaginal discharge  MSK: no arthralgias, no myalgias, no early morning stiffness, no Raynaud's   Neuro: no focal neurological deficits, no seizures  Sleep: no snoring, no daytime somnolence   Psych: no depression, no anxiety, no suicidal ideation      Physical Exam:  VITALS:   BP (!) 158/96 (Site: Right Upper Arm)   Pulse 86   Resp 18   Ht 5' 4\" (1.626 m)   Wt 121 lb (54.9 kg)   SpO2 100%   BMI 20.77 kg/m²     PHYSICAL EXAMINATION:  General: alert, awake, and oriented to time, place, person, and situation. Not in acute distress   Skin:  no suspicious rashes, no jaundice  Head: normocephalic/atraumatic  Eyes: anicteric sclera, well-injected conjunctiva. Pupils are equally round and reactive to light. Extraocular movements are intact   Nose: no septal deviation evident  Sinuses: no sinus tenderness  Ears: external ears normal  Neck: supple, no cervical lymphadenopathy, thyroid symmetric and not enlarged, no bruits   Heart: regular rate and rhythm, regular S1/S2, no S3/S4, no audible murmurs, no audible friction rub  Lungs: clear to auscultation bilaterally, no audible crackles, no audible wheezes, no audible rhonchi    Abdomen: normal bowel sounds, soft abdomen, non-tender, no palpable masses  Extremities: no edema, warm, no cyanosis, no clubbing. Good capillary refill   MSK: no tenderness across spinous processes, full ROM in all 4 extremities.  No joint swelling or tenderness   Peripheral vascular: 2+ pulses symmetric (radial)  Neuro: gait normal, CN II-XII intact, motor power 5/5 in all 4 extremities, sensation intact and symmetric    Labs   I have personally reviewed labs, and discussed pertinent findings with patient on this date 10/14/2022     Imaging   I have personally reviewed imaging, and discussed pertinent findings with patient on this date 10/14/2022     Other notes  I have personally reviewed other notes, and discussed pertinent findings with patient on this date 10/14/2022       Assessment/Plan:     1. Hypertension, unspecified type  Low threshold to start 2ry HTN work up   Continue BP and HR log   - CBC with Auto Differential; Future  - Comprehensive Metabolic Panel; Future  - Vitamin D 25 Hydroxy; Future  - TSH with Reflex; Future  - Cortisol AM, Total; Future  - ACTH; Future    2. Palpitations  Low threshold to start 2ry HTN work up   Continue BP and HR log   - CBC with Auto Differential; Future  - Comprehensive Metabolic Panel; Future  - Vitamin D 25 Hydroxy; Future  - TSH with Reflex; Future  - Cortisol AM, Total; Future  - ACTH; Future    3. Night sweats  - CBC with Auto Differential; Future  - Comprehensive Metabolic Panel; Future  - Vitamin D 25 Hydroxy; Future  - TSH with Reflex; Future  - Cortisol AM, Total; Future  - ACTH; Future    4. Hot flashes  - CBC with Auto Differential; Future  - Comprehensive Metabolic Panel; Future  - Vitamin D 25 Hydroxy; Future  - TSH with Reflex; Future  - Cortisol AM, Total; Future  - ACTH; Future    5. Anxiety  Discussed side effects  If anxiety treatment does not improve BP, will plan for 2ry HTN work up   - citalopram (CELEXA) 10 MG tablet; Take 1 tablet by mouth daily  Dispense: 30 tablet; Refill: 1  - busPIRone (BUSPAR) 5 MG tablet; Take 1 tablet by mouth 2 times daily as needed (anxiety)  Dispense: 60 tablet; Refill: 1    6. Dyslipidemia  - Lipid, Fasting; Future    7. Vitamin D deficiency  - Vitamin D 25 Hydroxy;  Future    On this date 10/14/2022 , I have spent 43 minutes reviewing previous notes and test results, as well as face-to-face time with the patient discussing the diagnosis and importance of compliance with the treatment plan. Care discussed with patient and questions answered. Patient verbalizes understanding and agrees with plan. Discussed with patient the importance of continuity of care. I encouraged patient to schedule next appointment within 4 weeks with me. Patient prefers to be reached by Phone call at [phone] for future medical correspondence. Encouraged to activate MyChart. I reviewed and reconciled the medications this visit. I reviewed and updated the past medical, surgical, social, and family history during this visit. After visit summary provided.        Esdras Maria MD  Internal Medicine  10/14/2022   3:20 PM

## 2022-10-14 NOTE — PATIENT INSTRUCTIONS
Fasting for a blood test: taking the right steps before testing helps ensure your results will be accurate. Why do I need to fast before my blood test?  If your healthcare provider has told you to fast before a blood test, it means you should not eat or drink anything, except water, for several hours before your test. When you eat and drink normally, those foods and beverages are absorbed into your bloodstream. That could affect the results of certain types of blood tests. What types of blood tests require fasting? The most common tests that require fasting are:  Glucose tests, which measure your blood sugar. Lipid tests, which measure cholesterol and triglycerides. You do not need to be fasting for HbA1C test.     How long do I have to fast before the test?  You usually need to fast for 8-12 hours before the test. Most tests that require fasting are scheduled for early in the morning. That way, most of your fasting time will be overnight. Can I drink anything besides water during a fast?  No. Juice, coffee, soda, and other beverages can get in your bloodstream and affect your results. In addition, you should not:  Chew gum   Smoke   Exercise  These activities can also affect your results. But you can drink water. It's actually encouraged that you drink 2 glasses of water before any blood test. It helps keep more fluid in your veins, which can make it easier to draw blood. If you are dehydrated, your blood draw experience may be unpleasant. Can I continue taking medicine during a fast?  Most of the time it's OK to take your usual medicines with water, unless otherwise specified by your healthcare provider. You may need to avoid certain medicines that you normally take with food.      What if I make a mistake and have something to eat or drink besides water during my fast?  Tell your healthcare provider before your test. Your test will most likely have to be re-scheduled for another time when you are able to complete your fast.    When can I eat and drink normally again? As soon as your test is over. You may want to bring a snack with you, so you can eat right away. Is there anything else I need to know about fasting before a blood test?  Be sure to talk to your healthcare provider if you have any questions or concerns about fasting. You should talk to your provider before taking any lab test. Most tests don't require fasting or other special preparations. For others, you may need to avoid certain foods, medicines, or activities.        [facility]  [phone]

## 2022-11-22 RX ORDER — MEDROXYPROGESTERONE ACETATE 150 MG/ML
INJECTION, SUSPENSION INTRAMUSCULAR
Qty: 1 ML | Refills: 0 | OUTPATIENT
Start: 2022-11-22

## 2022-11-29 RX ORDER — MEDROXYPROGESTERONE ACETATE 150 MG/ML
INJECTION, SUSPENSION INTRAMUSCULAR
Qty: 1 ML | Refills: 0 | Status: SHIPPED | OUTPATIENT
Start: 2022-11-29

## 2022-11-29 NOTE — TELEPHONE ENCOUNTER
Pt called requesting one time refill of Depo Provera. Pt  scheduled annual on 12/8/22. Medication is pended.

## 2024-02-21 ENCOUNTER — OFFICE VISIT (OUTPATIENT)
Dept: FAMILY MEDICINE CLINIC | Age: 39
End: 2024-02-21
Payer: COMMERCIAL

## 2024-02-21 VITALS
BODY MASS INDEX: 21.46 KG/M2 | SYSTOLIC BLOOD PRESSURE: 160 MMHG | WEIGHT: 125 LBS | HEART RATE: 76 BPM | DIASTOLIC BLOOD PRESSURE: 98 MMHG | OXYGEN SATURATION: 100 % | TEMPERATURE: 98.9 F

## 2024-02-21 DIAGNOSIS — R03.0 ELEVATED BLOOD PRESSURE READING: ICD-10-CM

## 2024-02-21 DIAGNOSIS — J40 BRONCHITIS: Primary | ICD-10-CM

## 2024-02-21 LAB
INFLUENZA A ANTIGEN, POC: NEGATIVE
INFLUENZA B ANTIGEN, POC: NEGATIVE
LOT EXPIRE DATE: NORMAL
LOT KIT NUMBER: NORMAL
SARS-COV-2, POC: NORMAL
VALID INTERNAL CONTROL: NORMAL
VENDOR AND KIT NAME POC: NORMAL

## 2024-02-21 PROCEDURE — 87428 SARSCOV & INF VIR A&B AG IA: CPT | Performed by: NURSE PRACTITIONER

## 2024-02-21 PROCEDURE — 99213 OFFICE O/P EST LOW 20 MIN: CPT | Performed by: NURSE PRACTITIONER

## 2024-02-21 RX ORDER — AZITHROMYCIN 250 MG/1
TABLET, FILM COATED ORAL
Qty: 1 PACKET | Refills: 0 | Status: SHIPPED | OUTPATIENT
Start: 2024-02-21

## 2024-02-21 RX ORDER — BENZONATATE 100 MG/1
100 CAPSULE ORAL 3 TIMES DAILY PRN
Qty: 20 CAPSULE | Refills: 0 | Status: SHIPPED | OUTPATIENT
Start: 2024-02-21

## 2024-02-21 SDOH — HEALTH STABILITY: PHYSICAL HEALTH: ON AVERAGE, HOW MANY DAYS PER WEEK DO YOU ENGAGE IN MODERATE TO STRENUOUS EXERCISE (LIKE A BRISK WALK)?: 5 DAYS

## 2024-02-21 SDOH — HEALTH STABILITY: PHYSICAL HEALTH: ON AVERAGE, HOW MANY MINUTES DO YOU ENGAGE IN EXERCISE AT THIS LEVEL?: 40 MIN

## 2024-02-21 ASSESSMENT — ENCOUNTER SYMPTOMS
SINUS PAIN: 0
SWOLLEN GLANDS: 0
NAUSEA: 0
VOMITING: 0
ABDOMINAL PAIN: 0
SINUS PRESSURE: 0
SORE THROAT: 0
RHINORRHEA: 0
COUGH: 1
WHEEZING: 0
DIARRHEA: 0
CHEST TIGHTNESS: 1
SHORTNESS OF BREATH: 1

## 2024-02-21 NOTE — PROGRESS NOTES
Padmini Marin   38 y.o.  female  [id number]      Chief Complaint   Patient presents with    URI     Congestion cough body aches scratchy-started Sunday-per pt         Subjective:  38 y.o.female is here for a follow up. She has the following chronic/acute medical problems:  Patient Active Problem List   Diagnosis    Anxiety    Depression    Fatigue    Polyphagia    Nephrolithiasis    Dysmenorrhea    Migraine without aura and without status migrainosus, not intractable    Irregular menses    Elevated blood pressure reading in office with white coat syndrome, without diagnosis of hypertension    Family history of malignant neoplasm of ovary in first degree relative    Pelvic pain       URI   This is a new problem. The current episode started in the past 7 days (Started Sundays). The problem has been unchanged. There has been no fever. Associated symptoms include congestion, coughing (coughin up phlegm) and ear pain. Pertinent negatives include no abdominal pain, chest pain, diarrhea, dysuria, headaches, joint pain, joint swelling, nausea, neck pain, plugged ear sensation, rash, rhinorrhea, sinus pain, sneezing, sore throat, swollen glands, vomiting or wheezing.       Review of Systems   Constitutional:  Positive for appetite change (Decreased), chills and fatigue. Negative for diaphoresis and fever.   HENT:  Positive for congestion, ear pain and postnasal drip. Negative for rhinorrhea, sinus pressure, sinus pain, sneezing and sore throat.    Respiratory:  Positive for cough (coughin up phlegm), chest tightness and shortness of breath. Negative for wheezing.    Cardiovascular:  Negative for chest pain and palpitations.   Gastrointestinal:  Negative for abdominal pain, diarrhea, nausea and vomiting.   Genitourinary:  Negative for dysuria.   Musculoskeletal:  Positive for myalgias. Negative for joint pain and neck pain.   Skin:  Negative for rash.   Neurological:  Negative for dizziness, light-headedness and

## 2024-02-23 ENCOUNTER — OFFICE VISIT (OUTPATIENT)
Dept: FAMILY MEDICINE CLINIC | Age: 39
End: 2024-02-23
Payer: COMMERCIAL

## 2024-02-23 VITALS
HEART RATE: 76 BPM | RESPIRATION RATE: 18 BRPM | DIASTOLIC BLOOD PRESSURE: 98 MMHG | WEIGHT: 127.6 LBS | SYSTOLIC BLOOD PRESSURE: 142 MMHG | HEIGHT: 64 IN | OXYGEN SATURATION: 98 % | BODY MASS INDEX: 21.78 KG/M2

## 2024-02-23 DIAGNOSIS — Z76.89 ENCOUNTER TO ESTABLISH CARE WITH NEW DOCTOR: Primary | ICD-10-CM

## 2024-02-23 DIAGNOSIS — I10 PRIMARY HYPERTENSION: ICD-10-CM

## 2024-02-23 DIAGNOSIS — Z82.49 FAMILY HISTORY OF HEART DISEASE: ICD-10-CM

## 2024-02-23 PROCEDURE — 93000 ELECTROCARDIOGRAM COMPLETE: CPT | Performed by: PHYSICIAN ASSISTANT

## 2024-02-23 PROCEDURE — 3080F DIAST BP >= 90 MM HG: CPT | Performed by: PHYSICIAN ASSISTANT

## 2024-02-23 PROCEDURE — 36415 COLL VENOUS BLD VENIPUNCTURE: CPT | Performed by: PHYSICIAN ASSISTANT

## 2024-02-23 PROCEDURE — 3077F SYST BP >= 140 MM HG: CPT | Performed by: PHYSICIAN ASSISTANT

## 2024-02-23 PROCEDURE — 99214 OFFICE O/P EST MOD 30 MIN: CPT | Performed by: PHYSICIAN ASSISTANT

## 2024-02-23 RX ORDER — LISINOPRIL AND HYDROCHLOROTHIAZIDE 12.5; 1 MG/1; MG/1
1 TABLET ORAL DAILY
Qty: 90 TABLET | Refills: 1 | Status: SHIPPED | OUTPATIENT
Start: 2024-02-23

## 2024-02-23 RX ORDER — LISINOPRIL AND HYDROCHLOROTHIAZIDE 12.5; 1 MG/1; MG/1
1 TABLET ORAL DAILY
Qty: 90 TABLET | Refills: 1 | Status: SHIPPED | OUTPATIENT
Start: 2024-02-23 | End: 2024-02-23

## 2024-02-23 SDOH — ECONOMIC STABILITY: FOOD INSECURITY: WITHIN THE PAST 12 MONTHS, YOU WORRIED THAT YOUR FOOD WOULD RUN OUT BEFORE YOU GOT MONEY TO BUY MORE.: NEVER TRUE

## 2024-02-23 SDOH — ECONOMIC STABILITY: INCOME INSECURITY: HOW HARD IS IT FOR YOU TO PAY FOR THE VERY BASICS LIKE FOOD, HOUSING, MEDICAL CARE, AND HEATING?: NOT HARD AT ALL

## 2024-02-23 SDOH — ECONOMIC STABILITY: FOOD INSECURITY: WITHIN THE PAST 12 MONTHS, THE FOOD YOU BOUGHT JUST DIDN'T LAST AND YOU DIDN'T HAVE MONEY TO GET MORE.: NEVER TRUE

## 2024-02-23 SDOH — ECONOMIC STABILITY: HOUSING INSECURITY
IN THE LAST 12 MONTHS, WAS THERE A TIME WHEN YOU DID NOT HAVE A STEADY PLACE TO SLEEP OR SLEPT IN A SHELTER (INCLUDING NOW)?: NO

## 2024-02-23 ASSESSMENT — PATIENT HEALTH QUESTIONNAIRE - PHQ9
SUM OF ALL RESPONSES TO PHQ QUESTIONS 1-9: 5
1. LITTLE INTEREST OR PLEASURE IN DOING THINGS: 0
SUM OF ALL RESPONSES TO PHQ9 QUESTIONS 1 & 2: 0
10. IF YOU CHECKED OFF ANY PROBLEMS, HOW DIFFICULT HAVE THESE PROBLEMS MADE IT FOR YOU TO DO YOUR WORK, TAKE CARE OF THINGS AT HOME, OR GET ALONG WITH OTHER PEOPLE: 0
7. TROUBLE CONCENTRATING ON THINGS, SUCH AS READING THE NEWSPAPER OR WATCHING TELEVISION: 1
2. FEELING DOWN, DEPRESSED OR HOPELESS: 0
5. POOR APPETITE OR OVEREATING: 0
SUM OF ALL RESPONSES TO PHQ QUESTIONS 1-9: 5
9. THOUGHTS THAT YOU WOULD BE BETTER OFF DEAD, OR OF HURTING YOURSELF: 0
4. FEELING TIRED OR HAVING LITTLE ENERGY: 0
6. FEELING BAD ABOUT YOURSELF - OR THAT YOU ARE A FAILURE OR HAVE LET YOURSELF OR YOUR FAMILY DOWN: 0
3. TROUBLE FALLING OR STAYING ASLEEP: 2
8. MOVING OR SPEAKING SO SLOWLY THAT OTHER PEOPLE COULD HAVE NOTICED. OR THE OPPOSITE, BEING SO FIGETY OR RESTLESS THAT YOU HAVE BEEN MOVING AROUND A LOT MORE THAN USUAL: 2

## 2024-02-23 NOTE — PATIENT INSTRUCTIONS
Welcome to [facility]:    Did you know we now have a faster way for you to move through your appointment? For your convenience, we now have digital registration available. When you schedule your next appointment, you will receive a link via your email as well as a text message that will allow you to complete any paperwork digitally before your appointment. We are committed to providing you the best care possible.    If you receive a survey after visiting one of our offices, please take time to share your experience concerning your physician office visit.  These surveys are confidential and no health information about you is shared.    We are eager to improve for you and continue to give you satisfactory care, we are counting on your feedback to help make that happen.

## 2024-02-23 NOTE — PROGRESS NOTES
2/23/2024    [place]mini [place]    Chief Complaint   Patient presents with    New Patient    Hypertension     Pt reports BP has been running around 160/98       HPI  History was obtained from pt.  Padmini is a 38 y.o. female with a PMHx of       Anxiety    Depression    Fatigue    Polyphagia    Nephrolithiasis    Dysmenorrhea    Migraine without aura and without status migrainosus, not intractable    Irregular menses    Elevated blood pressure reading in office with white coat syndrome, without diagnosis of hypertension    Family history of malignant neoplasm of ovary in first degree relative    Pelvic pain      who presents today to establish care.     Anxiety    Depression    Fatigue    Polyphagia    Nephrolithiasis    Dysmenorrhea    Migraine without aura and without status migrainosus, not intractable - botox    Irregular menses    Elevated blood pressure reading in office with white coat syndrome, without diagnosis of hypertension    Family history of malignant neoplasm of ovary in first degree relative    Pelvic pain   Cervical arthritis, not currently in pain from it    Specialists:  Was previously seeing neuro for migraines, but they're under control  Dr. Villa is doing botox injections and they're helping  [facility]    Acute Concerns:  Hypertension - patient does get heart flutters occasionally  Does sweat a lot constantly    Family History:  Father had a heart attack at 45 years old    Recent Labs:  none    Care Gaps:  Needs PAP smear    Alcohol use - variable, ~5 drinks weeks    1. Encounter to establish care with new doctor    2. Primary hypertension    3. Family history of heart disease         REVIEW OF SYMPTOMS    Review of Systems    PAST MEDICAL HISTORY  Past Medical History:   Diagnosis Date    Abnormal Pap smear of cervix     Anxiety     Breast lump in female     Chronic back pain     Depression 07/16/2015    Headache(784.0)     Hypertension     Ovarian cyst     multi    Pelvic pain

## 2024-02-24 LAB
ALBUMIN SERPL-MCNC: 4.8 G/DL (ref 3.4–5)
ALBUMIN/GLOB SERPL: 1.9 {RATIO} (ref 1.1–2.2)
ALP SERPL-CCNC: 66 U/L (ref 40–129)
ALT SERPL-CCNC: 11 U/L (ref 10–40)
ANION GAP SERPL CALCULATED.3IONS-SCNC: 10 MMOL/L (ref 3–16)
AST SERPL-CCNC: 17 U/L (ref 15–37)
BASOPHILS # BLD: 0 K/UL (ref 0–0.2)
BASOPHILS NFR BLD: 0.3 %
BILIRUB SERPL-MCNC: 0.3 MG/DL (ref 0–1)
BUN SERPL-MCNC: 11 MG/DL (ref 7–20)
CALCIUM SERPL-MCNC: 9.4 MG/DL (ref 8.3–10.6)
CHLORIDE SERPL-SCNC: 100 MMOL/L (ref 99–110)
CHOLEST SERPL-MCNC: 128 MG/DL (ref 0–199)
CO2 SERPL-SCNC: 27 MMOL/L (ref 21–32)
CREAT SERPL-MCNC: 1 MG/DL (ref 0.6–1.1)
DEPRECATED RDW RBC AUTO: 13.1 % (ref 12.4–15.4)
EOSINOPHIL # BLD: 0.1 K/UL (ref 0–0.6)
EOSINOPHIL NFR BLD: 1.3 %
GFR SERPLBLD CREATININE-BSD FMLA CKD-EPI: >60 ML/MIN/{1.73_M2}
GLUCOSE SERPL-MCNC: 85 MG/DL (ref 70–99)
HCT VFR BLD AUTO: 45 % (ref 36–48)
HDLC SERPL-MCNC: 48 MG/DL (ref 40–60)
HGB BLD-MCNC: 15.3 G/DL (ref 12–16)
LDL CHOLESTEROL CALCULATED: 69 MG/DL
LYMPHOCYTES # BLD: 1.2 K/UL (ref 1–5.1)
LYMPHOCYTES NFR BLD: 28.3 %
MCH RBC QN AUTO: 31.2 PG (ref 26–34)
MCHC RBC AUTO-ENTMCNC: 33.9 G/DL (ref 31–36)
MCV RBC AUTO: 92.2 FL (ref 80–100)
MONOCYTES # BLD: 0.5 K/UL (ref 0–1.3)
MONOCYTES NFR BLD: 11.8 %
NEUTROPHILS # BLD: 2.5 K/UL (ref 1.7–7.7)
NEUTROPHILS NFR BLD: 58.3 %
PLATELET # BLD AUTO: 208 K/UL (ref 135–450)
PLATELET BLD QL SMEAR: ADEQUATE
PMV BLD AUTO: 10.1 FL (ref 5–10.5)
POTASSIUM SERPL-SCNC: 4 MMOL/L (ref 3.5–5.1)
PROT SERPL-MCNC: 7.3 G/DL (ref 6.4–8.2)
RBC # BLD AUTO: 4.88 M/UL (ref 4–5.2)
SLIDE REVIEW: NORMAL
SODIUM SERPL-SCNC: 137 MMOL/L (ref 136–145)
TRIGL SERPL-MCNC: 53 MG/DL (ref 0–150)
TSH SERPL DL<=0.005 MIU/L-ACNC: 1.05 UIU/ML (ref 0.27–4.2)
VLDLC SERPL CALC-MCNC: 11 MG/DL
WBC # BLD AUTO: 4.3 K/UL (ref 4–11)

## 2024-07-12 ENCOUNTER — OFFICE VISIT (OUTPATIENT)
Dept: OBGYN | Age: 39
End: 2024-07-12
Payer: COMMERCIAL

## 2024-07-12 ENCOUNTER — HOSPITAL ENCOUNTER (OUTPATIENT)
Age: 39
Setting detail: SPECIMEN
Discharge: HOME | End: 2024-07-12
Payer: COMMERCIAL

## 2024-07-12 VITALS
HEIGHT: 64 IN | SYSTOLIC BLOOD PRESSURE: 159 MMHG | WEIGHT: 123 LBS | DIASTOLIC BLOOD PRESSURE: 91 MMHG | BODY MASS INDEX: 21 KG/M2

## 2024-07-12 DIAGNOSIS — Z01.419 WOMEN'S ANNUAL ROUTINE GYNECOLOGICAL EXAMINATION: Primary | ICD-10-CM

## 2024-07-12 DIAGNOSIS — I10 ELEVATED BLOOD PRESSURE READING IN OFFICE WITH DIAGNOSIS OF HYPERTENSION: ICD-10-CM

## 2024-07-12 DIAGNOSIS — Z11.51 SCREENING FOR HUMAN PAPILLOMAVIRUS (HPV): ICD-10-CM

## 2024-07-12 DIAGNOSIS — N94.6 DYSMENORRHEA: ICD-10-CM

## 2024-07-12 DIAGNOSIS — Z12.4 SCREENING FOR CERVICAL CANCER: ICD-10-CM

## 2024-07-12 PROCEDURE — 88142 CYTOPATH C/V THIN LAYER: CPT

## 2024-07-12 PROCEDURE — 87624 HPV HI-RISK TYP POOLED RSLT: CPT

## 2024-07-12 PROCEDURE — 3077F SYST BP >= 140 MM HG: CPT | Performed by: NURSE PRACTITIONER

## 2024-07-12 PROCEDURE — 3080F DIAST BP >= 90 MM HG: CPT | Performed by: NURSE PRACTITIONER

## 2024-07-12 RX ORDER — MEDROXYPROGESTERONE ACETATE 150 MG/ML
150 INJECTION, SUSPENSION INTRAMUSCULAR
Qty: 1 ML | Refills: 3 | Status: SHIPPED | OUTPATIENT
Start: 2024-07-12

## 2024-07-12 ASSESSMENT — ENCOUNTER SYMPTOMS
GASTROINTESTINAL NEGATIVE: 1
VOMITING: 0
ABDOMINAL PAIN: 0
DIARRHEA: 0
SHORTNESS OF BREATH: 0
RESPIRATORY NEGATIVE: 1
NAUSEA: 0
CONSTIPATION: 0

## 2024-07-12 NOTE — PROGRESS NOTES
24    [facility]  1985    Chief Complaint   Patient presents with    Gynecologic Exam     Annual exam, regular menses, LMP 2024, is sexually active, Date of last Cervical Cancer screen (HPV or PAP): 11/15/2021 normal. Would like to discuss restarting depo.         The patient is a 39 y.o. female,  who presents for her annual exam.  She  menses regular .  She is  sexually active. She is not currently taking birth control.      Pap smear history: Her last PAP smear was in .  Her results were normal.    Past Medical History:   Diagnosis Date    Abnormal Pap smear of cervix     Anxiety     Breast lump in female     Chronic back pain     Depression 2015    Headache(784.0)     Hypertension     Ovarian cyst     multi    Pelvic pain     Restless legs syndrome        Past Surgical History:   Procedure Laterality Date    APPENDECTOMY      CYST REMOVAL      head     HERNIA REPAIR      OTHER SURGICAL HISTORY  2016    lap appy       Family History   Problem Relation Age of Onset    Other Mother         autoimmune disease /gout /liver issues     Anemia Mother     Arthritis Mother     Immune Disorder Mother     Substance Abuse Mother     Cancer Father 50        pancreas and lungs     Hypertension Father     Diabetes Father     Heart Disease Father     Heart Attack Father 50    Coronary Art Dis Father     High Cholesterol Father     Stroke Father 50    Depression Sister     Anxiety Disorder Sister     Immune Disorder Sister     Clotting Disorder Maternal Grandmother     Kidney Disease Maternal Grandmother     Cancer Maternal Grandfather     Parkinsonism Paternal Grandfather        Social History     Tobacco Use    Smoking status: Never    Smokeless tobacco: Never   Vaping Use    Vaping Use: Never used   Substance Use Topics    Alcohol use: Yes     Comment: I drink on the weekend    Drug use: No       Current Outpatient Medications   Medication Sig Dispense Refill    [email or web address]

## 2024-07-20 LAB — HPV HIGH RISK: NOT DETECTED

## 2024-07-29 RX ORDER — PREDNISONE 20 MG/1
20 TABLET ORAL 2 TIMES DAILY
Qty: 14 TABLET | Refills: 0 | Status: SHIPPED | OUTPATIENT
Start: 2024-07-29 | End: 2024-08-05

## 2024-07-29 NOTE — PROGRESS NOTES
Patient called office and having severe reaction to poison ivy which has been getting worse over the last week.

## 2024-09-12 DIAGNOSIS — J01.90 ACUTE SINUSITIS, RECURRENCE NOT SPECIFIED, UNSPECIFIED LOCATION: Primary | ICD-10-CM

## 2024-09-23 ENCOUNTER — OFFICE VISIT (OUTPATIENT)
Dept: FAMILY MEDICINE CLINIC | Age: 39
End: 2024-09-23
Payer: COMMERCIAL

## 2024-09-23 VITALS
BODY MASS INDEX: 22.14 KG/M2 | OXYGEN SATURATION: 99 % | TEMPERATURE: 98.1 F | WEIGHT: 129 LBS | SYSTOLIC BLOOD PRESSURE: 156 MMHG | DIASTOLIC BLOOD PRESSURE: 102 MMHG | HEART RATE: 80 BPM

## 2024-09-23 DIAGNOSIS — Z29.9 PROPHYLACTIC MEASURE: ICD-10-CM

## 2024-09-23 DIAGNOSIS — J01.00 ACUTE NON-RECURRENT MAXILLARY SINUSITIS: Primary | ICD-10-CM

## 2024-09-23 PROCEDURE — 99213 OFFICE O/P EST LOW 20 MIN: CPT | Performed by: NURSE PRACTITIONER

## 2024-09-23 RX ORDER — DOXYCYCLINE HYCLATE 100 MG
100 TABLET ORAL 2 TIMES DAILY
Qty: 14 TABLET | Refills: 0 | Status: SHIPPED | OUTPATIENT
Start: 2024-09-23 | End: 2024-09-30

## 2024-09-23 RX ORDER — METHYLPREDNISOLONE 4 MG
TABLET, DOSE PACK ORAL
Qty: 1 KIT | Refills: 0 | Status: SHIPPED | OUTPATIENT
Start: 2024-09-23 | End: 2024-09-29

## 2024-09-23 ASSESSMENT — ENCOUNTER SYMPTOMS
GASTROINTESTINAL NEGATIVE: 1
SHORTNESS OF BREATH: 0
SINUS PRESSURE: 1
SINUS PAIN: 1
RESPIRATORY NEGATIVE: 1
COUGH: 0

## 2024-09-24 RX ORDER — FLUCONAZOLE 150 MG/1
150 TABLET ORAL
Qty: 2 TABLET | Refills: 0 | Status: SHIPPED | OUTPATIENT
Start: 2024-09-24 | End: 2024-09-30

## 2024-11-20 SDOH — HEALTH STABILITY: PHYSICAL HEALTH: ON AVERAGE, HOW MANY DAYS PER WEEK DO YOU ENGAGE IN MODERATE TO STRENUOUS EXERCISE (LIKE A BRISK WALK)?: 5 DAYS

## 2024-11-21 ENCOUNTER — OFFICE VISIT (OUTPATIENT)
Dept: ORTHOPEDIC SURGERY | Age: 39
End: 2024-11-21
Payer: COMMERCIAL

## 2024-11-21 VITALS — HEART RATE: 78 BPM | HEIGHT: 64 IN | BODY MASS INDEX: 22.14 KG/M2 | OXYGEN SATURATION: 99 %

## 2024-11-21 DIAGNOSIS — G56.01 CARPAL TUNNEL SYNDROME OF RIGHT WRIST: ICD-10-CM

## 2024-11-21 DIAGNOSIS — G56.22 CUBITAL TUNNEL SYNDROME ON LEFT: ICD-10-CM

## 2024-11-21 DIAGNOSIS — M67.442 GANGLION CYST OF FINGER OF LEFT HAND: ICD-10-CM

## 2024-11-21 DIAGNOSIS — M19.042 PRIMARY OSTEOARTHRITIS OF LEFT HAND: ICD-10-CM

## 2024-11-21 DIAGNOSIS — G56.02 CARPAL TUNNEL SYNDROME OF LEFT WRIST: Primary | ICD-10-CM

## 2024-11-21 PROCEDURE — 99203 OFFICE O/P NEW LOW 30 MIN: CPT | Performed by: ORTHOPAEDIC SURGERY

## 2024-11-21 ASSESSMENT — ENCOUNTER SYMPTOMS
SHORTNESS OF BREATH: 0
EYE PAIN: 0
COLOR CHANGE: 0
EYE REDNESS: 0
VOMITING: 0
CHEST TIGHTNESS: 0
WHEEZING: 0

## 2024-11-21 NOTE — PROGRESS NOTES
11/21/2024   Chief Complaint   Patient presents with    Wrist Pain     Bilateral hand pain L>R cyst index middle    Cyst     Left hand cyst index and middle        History of Present Illness:                             Padmini Marin is a 39 y.o. female who presents today for evaluation of her bilateral left worse than right hand pain numbness and tingling.  Additionally she has radiating pain and numbness and travel up into the medial side of the left elbow but denies any problems in the right elbow.  She has numbness that involves the entirety of her hand but is most significant along the thumb index and middle fingers on the left side.  Numbness is constant at this time.  Her right hand is bothersome at night and with repetitive activities.  She feels that her left hand is losing strength and she has clumsiness with the fingers.    Symptoms been getting worse over the last few months and has not responded well to rest and stretching exercises.    Additionally she has noticed stiffness and pain in the interphalangeal joints of the left hand with most specific pain along the dorsal aspect of the index finger PIP and DIP joints.  She has prominent specific tender spots consistent with small spurs and ganglion cyst at the DIP and PIP joint of the index finger.  She denies any trauma but does have worsened pain with direct pressure or repetitive gripping or use of the index finger.      Patient presents to the office with bilateral hand pain. Pt stated her left is worse than her right. Pt stated she deals with numbness in all the fingers and will radiate pain up through the elbow. Pt stated she has trouble with daily activities and drops items often due to the numbness. She also noticed on the same hand she has a cyst on both the index and middle finger that create pain with bending. Pt denies any treatments but has tried stretching           Medical History  Patient's medications, allergies, past medical,

## 2024-11-21 NOTE — PROGRESS NOTES
Patient presents to the office with bilateral hand pain. Pt stated her left is worse than her right. Pt stated she deals with numbness in all the fingers and will radiate pain up through the elbow. Pt stated she has trouble with daily activities and drops items often due to the numbness. She also noticed on the same hand she has a cyst on both the index and middle finger that create pain with bending. Pt denies any treatments but has tried stretching.

## 2024-11-21 NOTE — PATIENT INSTRUCTIONS
You have been referred for an EMG with Dr. Eid.  If you have not heard from Dr. Eid's within 2-3 business days from today's appointment please call them at [phone].  Follow up in 4 weeks

## 2025-01-04 NOTE — PROGRESS NOTES
RHEUMATOLOGY NEW PATIENT VISIT    2025      Patient Name: Padmini Marin  : 1985  Medical Record: [id number]      CHIEF COMPLAINT    Bilateral hand stiffness  Elbow and shoulder pain    Pertinent Problems    HISTORY OF PRESENT ILLNESS    Padmini Marin is a 39 y.o. female who was referred for above symptoms. Symptom onset began with bilateral hand pain in  that progressed to involve bilateral elbows and shoulder pain. There is pain and swelling in ankles at the end of the day. She has not tried any med for this. She has been doing stretching exercises, massage therapy and yoga that has not helped.     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 History of  blood clots, dactylitis, uveitis, enthesitis, psoriasis, buttock pain, change in BM    Risk factors: Smoking, occasional etoh, no obesity, occasional marijuana gummy, no family hx of RA, her mother sees rheumatology      Current rheum meds:  none    Past rheum meds:       REVIEW OF SYSTEMS     Constitutional:  Denies fever or chills, decreased appetite, or weight loss   Eyes:  Denies change in visual acuity or eye dryness or irritation  HENT:  Denies dry mouth or oral ulcers  Respiratory:  Denies cough or shortness of breath   Cardiovascular:  Denies chest pain or edema   GI:  Denies

## 2025-01-07 DIAGNOSIS — R52 PAIN: ICD-10-CM

## 2025-01-07 DIAGNOSIS — G56.02 CARPAL TUNNEL SYNDROME OF LEFT WRIST: ICD-10-CM

## 2025-01-07 DIAGNOSIS — G56.22 CUBITAL TUNNEL SYNDROME ON LEFT: ICD-10-CM

## 2025-01-07 DIAGNOSIS — G56.01 CARPAL TUNNEL SYNDROME OF RIGHT WRIST: Primary | ICD-10-CM

## 2025-01-08 ASSESSMENT — RHEUMATOLOGY NEW PATIENT QUESTIONNAIRE
BLOOD IN STOOLS: N
LOSS OF CONSCIOUSNESS: N
BEHAVIORAL CHANGES: N
NUMBNESS OR TINGLING IN HANDS OR FEET: Y
JOINT SWELLING: Y
HOW WOULD YOU DESCRIBE YOUR STIFFNESS ON AVERAGE: MODERATE
DIFFICULTY FALLING ASLEEP: Y
DRYNESS OF MOUTH: N
ANXIETY: Y
NODULES/BUMPS: N
MUSCLE WEAKNESS: Y
MORNING STIFFNESS IN LOWER BACK: Y
VAGINAL DRYNESS: N
SUN SENSITIVE (SUN ALLERGY): N
NIGHT SWEATS: Y
ANEMIA: N
FEVER: N
RASH OR ULCERS: N
EYE DRYNESS: Y
UNUSUAL FATIGUE: Y
EASILY LOSING TEMPER: N
DIFFICULTY STAYING ASLEEP: Y
DOUBLE OR BLURRED VISION: N
HEADACHES: Y
SEIZURES: N
HOARSE VOICE: N
INCREASED SUSCEPTIBILITY TO INFECTION: N
PAIN OR BURNING ON URINATION: N
JAUNDICE: N
SORES IN MOUTH OR NOSE: N
SWOLLEN LEGS OR FEET: N
COLOR CHANGES OF HANDS OR FEET IN THE COLD: N
UNEXPLAINED WEIGHT CHANGE: N
CHEST PAIN: N
ABNORMAL URINE: N
VOMITING OF BLOOD OR COFFEE GROUND CONSISTENCY MATERIAL: N
EYE REDNESS: N
EXCESSIVE HAIR LOSS (MORE THAN YOUR NORM): N
AGITATION: N
EYE PAIN: Y
SKIN TIGHTNESS: N
PERSISTENT DIARRHEA: N
UNUSUALLY RAPID OR SLOWED HEART RATE: N
DIFFICULTY BREATHING LYING DOWN: N
COUGH: N
UNEXPLAINED HEARING LOSS: N
LOSS OF VISION: N
BLACK STOOLS: N
UNUSUAL BLEEDING: N
FAINTING: N
SKIN REDNESS: N
HEARTBURN OR REFLUX: N
MEMORY LOSS: N
DIFFICULTY SWALLOWING: N
RASH: N
NAUSEA: N
EASY BRUISING: N
SWOLLEN OR TENDER GLANDS: N
DEPRESSION: N
STOMACH PAIN: N
JOINT PAIN: Y
MORNING STIFFNESS: Y
SHORTNESS OF BREATH: N

## 2025-01-09 ENCOUNTER — OFFICE VISIT (OUTPATIENT)
Age: 40
End: 2025-01-09
Payer: COMMERCIAL

## 2025-01-09 VITALS
SYSTOLIC BLOOD PRESSURE: 164 MMHG | BODY MASS INDEX: 23.05 KG/M2 | HEIGHT: 64 IN | WEIGHT: 135 LBS | RESPIRATION RATE: 16 BRPM | DIASTOLIC BLOOD PRESSURE: 98 MMHG | HEART RATE: 96 BPM | OXYGEN SATURATION: 100 %

## 2025-01-09 DIAGNOSIS — M25.50 POLYARTHRALGIA: Primary | ICD-10-CM

## 2025-01-09 DIAGNOSIS — Z01.89 ENCOUNTER FOR OTHER SPECIFIED SPECIAL EXAMINATIONS: ICD-10-CM

## 2025-01-09 DIAGNOSIS — M25.642 STIFFNESS OF JOINTS OF BOTH HANDS: ICD-10-CM

## 2025-01-09 DIAGNOSIS — M25.641 STIFFNESS OF JOINTS OF BOTH HANDS: ICD-10-CM

## 2025-01-09 PROCEDURE — 99205 OFFICE O/P NEW HI 60 MIN: CPT | Performed by: STUDENT IN AN ORGANIZED HEALTH CARE EDUCATION/TRAINING PROGRAM

## 2025-01-09 NOTE — PATIENT INSTRUCTIONS
Patient Instructions  Complete ordered labs and get imaging done  We will discuss results at next visit  RTC in 3 weeks

## 2025-02-10 ENCOUNTER — PROCEDURE VISIT (OUTPATIENT)
Dept: PHYSICAL MEDICINE AND REHAB | Age: 40
End: 2025-02-10
Payer: COMMERCIAL

## 2025-02-10 DIAGNOSIS — G56.22 ULNAR NEUROPATHY AT ELBOW OF LEFT UPPER EXTREMITY: ICD-10-CM

## 2025-02-10 DIAGNOSIS — G56.03 BILATERAL CARPAL TUNNEL SYNDROME: Primary | ICD-10-CM

## 2025-02-10 PROCEDURE — 95886 MUSC TEST DONE W/N TEST COMP: CPT | Performed by: PHYSICAL MEDICINE & REHABILITATION

## 2025-02-10 PROCEDURE — 95913 NRV CNDJ TEST 13/> STUDIES: CPT | Performed by: PHYSICAL MEDICINE & REHABILITATION

## 2025-02-24 NOTE — PROGRESS NOTES
Leopoldo Langley M.D.     Osmani Sinha M.D.             Electromyographic Examination                Patient: Padmini Marin  Referring physician: Braxton Knapp MD   : 1985  Date of exam: 2/10/2025   MR #: [id number]        Chief Complaint: Upper limb paresthesias    Problem: Padmini Marin is a 39 year old female who reports bilateral upper limb paresthesias which have been worsening recently. Numbness and tingling primarily involves the first three digits bilaterally with rare involvement of the fifth digit on either side. On the left she notes radiating pain up the forearm to the left elbow. She denies proximal radiation on the right. She endorses chronic neck pain but denies radicular pain through either upper limb. On physical examination strength is 5/5 throughout the bilateral upper limbs. There is no visible atrophy. There are no visible fasciculations. Light touch sensation is impaired in a median nerve distribution in both hands and through the left lateral arm and posterior forearm. Right upper limb sensation is intact proximal to the wrist.    COMMENTS:  The bilateral median motor studies are prolonged in distal onset latency with normal amplitudes and conduction velocities. The bilateral median sensory conductions are prolonged in peak latency with normal amplitudes. There is relative prolongation compared to the ulnar and radial sensory studies bilaterally. 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 These findings are slightly worse on the left when compared to the right.  The bilateral ulnar motor studies are normal distally however there is mild slowing across the left elbow. There is no slowing across the right elbow. Ulnar sensory conductions are normal bilaterally.   All distal motor and sensory studies are normal in bother upper limbs outside of the median nerve distribution. There is no

## 2025-08-06 ENCOUNTER — APPOINTMENT (OUTPATIENT)
Dept: CT IMAGING | Age: 40
End: 2025-08-06
Payer: COMMERCIAL

## 2025-08-06 ENCOUNTER — HOSPITAL ENCOUNTER (OUTPATIENT)
Age: 40
Setting detail: OBSERVATION
Discharge: HOME | End: 2025-08-08
Attending: EMERGENCY MEDICINE | Admitting: INTERNAL MEDICINE
Payer: COMMERCIAL

## 2025-08-06 DIAGNOSIS — R20.2 PARESTHESIAS: Primary | ICD-10-CM

## 2025-08-06 DIAGNOSIS — E04.1 THYROID NODULE: ICD-10-CM

## 2025-08-06 DIAGNOSIS — R11.2 NAUSEA AND VOMITING, UNSPECIFIED VOMITING TYPE: ICD-10-CM

## 2025-08-06 DIAGNOSIS — I63.9 CEREBROVASCULAR ACCIDENT (CVA), UNSPECIFIED MECHANISM (HCC): ICD-10-CM

## 2025-08-06 PROBLEM — Z32.01 POSITIVE PREGNANCY TEST: Status: ACTIVE | Noted: 2025-08-06

## 2025-08-06 LAB
ALBUMIN SERPL-MCNC: 4.9 G/DL (ref 3.4–5)
ALBUMIN/GLOB SERPL: 1.8 {RATIO} (ref 1.1–2.2)
ALP SERPL-CCNC: 59 U/L (ref 40–129)
ALT SERPL-CCNC: 13 U/L (ref 10–40)
ANION GAP SERPL CALCULATED.3IONS-SCNC: 16 MMOL/L (ref 9–17)
AST SERPL-CCNC: 20 U/L (ref 15–37)
B-HCG SERPL EIA 3RD IS-ACNC: 54.8 MIU/ML
BASOPHILS # BLD: 0.05 K/UL
BASOPHILS NFR BLD: 0 % (ref 0–1)
BILIRUB SERPL-MCNC: 0.8 MG/DL (ref 0–1)
BUN SERPL-MCNC: 18 MG/DL (ref 7–20)
CALCIUM SERPL-MCNC: 10.2 MG/DL (ref 8.3–10.6)
CHLORIDE SERPL-SCNC: 101 MMOL/L (ref 99–110)
CHP ED QC CHECK: YES
CO2 SERPL-SCNC: 18 MMOL/L (ref 21–32)
CREAT SERPL-MCNC: 1 MG/DL (ref 0.6–1.1)
EOSINOPHIL # BLD: 0.02 K/UL
EOSINOPHILS RELATIVE PERCENT: 0 % (ref 0–3)
ERYTHROCYTE [DISTWIDTH] IN BLOOD BY AUTOMATED COUNT: 12.5 % (ref 11.7–14.9)
GFR, ESTIMATED: 62 ML/MIN/1.73M2
GLUCOSE BLD-MCNC: 114 MG/DL
GLUCOSE BLD-MCNC: 114 MG/DL (ref 74–99)
GLUCOSE SERPL-MCNC: 120 MG/DL (ref 74–99)
HCT VFR BLD AUTO: 48.7 % (ref 37–47)
HGB BLD-MCNC: 16.1 G/DL (ref 12.5–16)
IMM GRANULOCYTES # BLD AUTO: 0.03 K/UL
IMM GRANULOCYTES NFR BLD: 0 %
LYMPHOCYTES NFR BLD: 1.86 K/UL
LYMPHOCYTES RELATIVE PERCENT: 15 % (ref 24–44)
MCH RBC QN AUTO: 30.4 PG (ref 27–31)
MCHC RBC AUTO-ENTMCNC: 33.1 G/DL (ref 32–36)
MCV RBC AUTO: 92.1 FL (ref 78–100)
MONOCYTES NFR BLD: 0.49 K/UL
MONOCYTES NFR BLD: 4 % (ref 0–5)
NEUTROPHILS NFR BLD: 80 % (ref 36–66)
NEUTS SEG NFR BLD: 9.68 K/UL
PLATELET # BLD AUTO: 306 K/UL (ref 140–440)
PMV BLD AUTO: 10.9 FL (ref 7.5–11.1)
POTASSIUM SERPL-SCNC: 3.5 MMOL/L (ref 3.5–5.1)
PROT SERPL-MCNC: 7.5 G/DL (ref 6.4–8.2)
RBC # BLD AUTO: 5.29 M/UL (ref 4.2–5.4)
SODIUM SERPL-SCNC: 135 MMOL/L (ref 136–145)
TROPONIN I SERPL HS-MCNC: <6 NG/L (ref 0–14)
WBC OTHER # BLD: 12.1 K/UL (ref 4–10.5)

## 2025-08-06 PROCEDURE — 6360000004 HC RX CONTRAST MEDICATION: Performed by: EMERGENCY MEDICINE

## 2025-08-06 PROCEDURE — 94761 N-INVAS EAR/PLS OXIMETRY MLT: CPT

## 2025-08-06 PROCEDURE — 84484 ASSAY OF TROPONIN QUANT: CPT

## 2025-08-06 PROCEDURE — 82962 GLUCOSE BLOOD TEST: CPT

## 2025-08-06 PROCEDURE — 99223 1ST HOSP IP/OBS HIGH 75: CPT | Performed by: OBSTETRICS & GYNECOLOGY

## 2025-08-06 PROCEDURE — G0378 HOSPITAL OBSERVATION PER HR: HCPCS

## 2025-08-06 PROCEDURE — 93005 ELECTROCARDIOGRAM TRACING: CPT | Performed by: EMERGENCY MEDICINE

## 2025-08-06 PROCEDURE — 70450 CT HEAD/BRAIN W/O DYE: CPT

## 2025-08-06 PROCEDURE — 84702 CHORIONIC GONADOTROPIN TEST: CPT

## 2025-08-06 PROCEDURE — 85025 COMPLETE CBC W/AUTO DIFF WBC: CPT

## 2025-08-06 PROCEDURE — 2500000003 HC RX 250 WO HCPCS: Performed by: INTERNAL MEDICINE

## 2025-08-06 PROCEDURE — 80053 COMPREHEN METABOLIC PANEL: CPT

## 2025-08-06 PROCEDURE — 99285 EMERGENCY DEPT VISIT HI MDM: CPT

## 2025-08-06 PROCEDURE — 70498 CT ANGIOGRAPHY NECK: CPT

## 2025-08-06 PROCEDURE — 6370000000 HC RX 637 (ALT 250 FOR IP): Performed by: EMERGENCY MEDICINE

## 2025-08-06 RX ORDER — POLYETHYLENE GLYCOL 3350 17 G/17G
17 POWDER, FOR SOLUTION ORAL DAILY PRN
Status: DISCONTINUED | OUTPATIENT
Start: 2025-08-06 | End: 2025-08-06

## 2025-08-06 RX ORDER — ASPIRIN 81 MG/1
81 TABLET, CHEWABLE ORAL DAILY
Status: DISCONTINUED | OUTPATIENT
Start: 2025-08-06 | End: 2025-08-08 | Stop reason: HOSPADM

## 2025-08-06 RX ORDER — SODIUM CHLORIDE 0.9 % (FLUSH) 0.9 %
5-40 SYRINGE (ML) INJECTION EVERY 12 HOURS SCHEDULED
Status: DISCONTINUED | OUTPATIENT
Start: 2025-08-06 | End: 2025-08-08 | Stop reason: HOSPADM

## 2025-08-06 RX ORDER — ONDANSETRON 2 MG/ML
4 INJECTION INTRAMUSCULAR; INTRAVENOUS EVERY 6 HOURS PRN
Status: DISCONTINUED | OUTPATIENT
Start: 2025-08-06 | End: 2025-08-06

## 2025-08-06 RX ORDER — SWAB
1 SWAB, NON-MEDICATED MISCELLANEOUS DAILY
Status: DISCONTINUED | OUTPATIENT
Start: 2025-08-07 | End: 2025-08-08 | Stop reason: HOSPADM

## 2025-08-06 RX ORDER — ONDANSETRON 4 MG/1
4 TABLET, ORALLY DISINTEGRATING ORAL EVERY 8 HOURS PRN
Status: DISCONTINUED | OUTPATIENT
Start: 2025-08-06 | End: 2025-08-06

## 2025-08-06 RX ORDER — ASPIRIN 300 MG/1
300 SUPPOSITORY RECTAL DAILY
Status: DISCONTINUED | OUTPATIENT
Start: 2025-08-06 | End: 2025-08-08 | Stop reason: HOSPADM

## 2025-08-06 RX ORDER — ATORVASTATIN CALCIUM 40 MG/1
40 TABLET, FILM COATED ORAL NIGHTLY
Status: DISCONTINUED | OUTPATIENT
Start: 2025-08-06 | End: 2025-08-08 | Stop reason: HOSPADM

## 2025-08-06 RX ORDER — SODIUM CHLORIDE 9 MG/ML
INJECTION, SOLUTION INTRAVENOUS PRN
Status: DISCONTINUED | OUTPATIENT
Start: 2025-08-06 | End: 2025-08-08 | Stop reason: HOSPADM

## 2025-08-06 RX ORDER — ENOXAPARIN SODIUM 100 MG/ML
40 INJECTION SUBCUTANEOUS DAILY
Status: DISCONTINUED | OUTPATIENT
Start: 2025-08-08 | End: 2025-08-08 | Stop reason: HOSPADM

## 2025-08-06 RX ORDER — ASPIRIN 325 MG
325 TABLET ORAL ONCE
Status: COMPLETED | OUTPATIENT
Start: 2025-08-06 | End: 2025-08-06

## 2025-08-06 RX ORDER — SODIUM CHLORIDE 0.9 % (FLUSH) 0.9 %
5-40 SYRINGE (ML) INJECTION PRN
Status: DISCONTINUED | OUTPATIENT
Start: 2025-08-06 | End: 2025-08-08 | Stop reason: HOSPADM

## 2025-08-06 RX ORDER — IOPAMIDOL 755 MG/ML
80 INJECTION, SOLUTION INTRAVASCULAR
Status: COMPLETED | OUTPATIENT
Start: 2025-08-06 | End: 2025-08-06

## 2025-08-06 RX ORDER — ENOXAPARIN SODIUM 100 MG/ML
40 INJECTION SUBCUTANEOUS DAILY
Status: DISCONTINUED | OUTPATIENT
Start: 2025-08-07 | End: 2025-08-06

## 2025-08-06 RX ADMIN — SODIUM CHLORIDE, PRESERVATIVE FREE 10 ML: 5 INJECTION INTRAVENOUS at 21:35

## 2025-08-06 RX ADMIN — IOPAMIDOL 80 ML: 755 INJECTION, SOLUTION INTRAVENOUS at 15:04

## 2025-08-06 RX ADMIN — ASPIRIN 325 MG: 325 TABLET ORAL at 18:43

## 2025-08-07 ENCOUNTER — APPOINTMENT (OUTPATIENT)
Dept: ULTRASOUND IMAGING | Age: 40
End: 2025-08-07
Payer: COMMERCIAL

## 2025-08-07 ENCOUNTER — APPOINTMENT (OUTPATIENT)
Dept: NON INVASIVE DIAGNOSTICS | Age: 40
End: 2025-08-07
Attending: INTERNAL MEDICINE
Payer: COMMERCIAL

## 2025-08-07 PROBLEM — G43.409 HEMIPLEGIC MIGRAINE WITHOUT STATUS MIGRAINOSUS, NOT INTRACTABLE: Status: ACTIVE | Noted: 2025-08-07

## 2025-08-07 LAB
25(OH)D3 SERPL-MCNC: 31.9 NG/ML (ref 30–150)
ALBUMIN SERPL-MCNC: 4.1 G/DL (ref 3.4–5)
ALBUMIN/GLOB SERPL: 1.7 {RATIO} (ref 1.1–2.2)
ALP SERPL-CCNC: 48 U/L (ref 40–129)
ALT SERPL-CCNC: 6 U/L (ref 10–40)
ANION GAP SERPL CALCULATED.3IONS-SCNC: 12 MMOL/L (ref 9–17)
AST SERPL-CCNC: 16 U/L (ref 15–37)
BACTERIA URNS QL MICRO: ABNORMAL
BILIRUB SERPL-MCNC: 0.9 MG/DL (ref 0–1)
BILIRUB UR QL STRIP: NEGATIVE
BUN SERPL-MCNC: 17 MG/DL (ref 7–20)
CALCIUM SERPL-MCNC: 9 MG/DL (ref 8.3–10.6)
CHLORIDE SERPL-SCNC: 104 MMOL/L (ref 99–110)
CHLORIDE UR-SCNC: 62 MMOL/L (ref 110–250)
CHOLEST SERPL-MCNC: 135 MG/DL (ref 125–199)
CLARITY UR: ABNORMAL
CO2 SERPL-SCNC: 19 MMOL/L (ref 21–32)
COLOR UR: YELLOW
CREAT SERPL-MCNC: 0.8 MG/DL (ref 0.6–1.1)
CREAT UR-MCNC: 238 MG/DL (ref 28–217)
ECHO AO ROOT DIAM: 3.2 CM
ECHO AO ROOT INDEX: 2.04 CM/M2
ECHO AV AREA PEAK VELOCITY: 2 CM2
ECHO AV AREA VTI: 2.1 CM2
ECHO AV AREA/BSA PEAK VELOCITY: 1.3 CM2/M2
ECHO AV AREA/BSA VTI: 1.3 CM2/M2
ECHO AV MEAN GRADIENT: 6 MMHG
ECHO AV MEAN VELOCITY: 1.1 M/S
ECHO AV PEAK GRADIENT: 11 MMHG
ECHO AV PEAK VELOCITY: 1.7 M/S
ECHO AV VELOCITY RATIO: 0.76
ECHO AV VTI: 28.8 CM
ECHO BSA: 1.56 M2
ECHO EST RA PRESSURE: 3 MMHG
ECHO IVC PROX: 1.2 CM
ECHO LA AREA 4C: 8.1 CM2
ECHO LA DIAMETER INDEX: 1.53 CM/M2
ECHO LA DIAMETER: 2.4 CM
ECHO LA MAJOR AXIS: 4.6 CM
ECHO LA TO AORTIC ROOT RATIO: 0.75
ECHO LA VOL MOD A4C: 12 ML (ref 22–52)
ECHO LA VOLUME INDEX MOD A4C: 8 ML/M2 (ref 16–34)
ECHO LV E' LATERAL VELOCITY: 11.6 CM/S
ECHO LV E' SEPTAL VELOCITY: 9.1 CM/S
ECHO LV EDV A4C: 58 ML
ECHO LV EDV INDEX A4C: 37 ML/M2
ECHO LV EF PHYSICIAN: 55 %
ECHO LV EJECTION FRACTION A4C: 54 %
ECHO LV ESV A4C: 27 ML
ECHO LV ESV INDEX A4C: 17 ML/M2
ECHO LV FRACTIONAL SHORTENING: 41 % (ref 28–44)
ECHO LV INTERNAL DIMENSION DIASTOLE INDEX: 2.17 CM/M2
ECHO LV INTERNAL DIMENSION DIASTOLIC: 3.4 CM (ref 3.9–5.3)
ECHO LV INTERNAL DIMENSION SYSTOLIC INDEX: 1.27 CM/M2
ECHO LV INTERNAL DIMENSION SYSTOLIC: 2 CM
ECHO LV IVSD: 0.8 CM (ref 0.6–0.9)
ECHO LV MASS 2D: 78.3 G (ref 67–162)
ECHO LV MASS INDEX 2D: 49.9 G/M2 (ref 43–95)
ECHO LV POSTERIOR WALL DIASTOLIC: 0.9 CM (ref 0.6–0.9)
ECHO LV RELATIVE WALL THICKNESS RATIO: 0.53
ECHO LVOT AREA: 2.5 CM2
ECHO LVOT AV VTI INDEX: 0.83
ECHO LVOT DIAM: 1.8 CM
ECHO LVOT MEAN GRADIENT: 4 MMHG
ECHO LVOT PEAK GRADIENT: 7 MMHG
ECHO LVOT PEAK VELOCITY: 1.3 M/S
ECHO LVOT STROKE VOLUME INDEX: 38.6 ML/M2
ECHO LVOT SV: 60.5 ML
ECHO LVOT VTI: 23.8 CM
ECHO MV A VELOCITY: 0.87 M/S
ECHO MV E DECELERATION TIME (DT): 234 MS
ECHO MV E VELOCITY: 0.87 M/S
ECHO MV E/A RATIO: 1
ECHO MV E/E' LATERAL: 7.5
ECHO MV E/E' RATIO (AVERAGED): 8.53
ECHO MV E/E' SEPTAL: 9.56
ECHO RIGHT VENTRICULAR SYSTOLIC PRESSURE (RVSP): 15 MMHG
ECHO RV MID DIMENSION: 2.8 CM
ECHO TV REGURGITANT MAX VELOCITY: 1.7 M/S
ECHO TV REGURGITANT PEAK GRADIENT: 12 MMHG
EPI CELLS #/AREA URNS HPF: 26 /HPF
ERYTHROCYTE [DISTWIDTH] IN BLOOD BY AUTOMATED COUNT: 12.6 % (ref 11.7–14.9)
EST. AVERAGE GLUCOSE BLD GHB EST-MCNC: 104 MG/DL
FOLATE SERPL-MCNC: 15.4 NG/ML (ref 4.8–24.2)
GFR, ESTIMATED: 80 ML/MIN/1.73M2
GLUCOSE SERPL-MCNC: 84 MG/DL (ref 74–99)
GLUCOSE UR STRIP-MCNC: NEGATIVE MG/DL
HBA1C MFR BLD: 5.2 % (ref 4.2–6.3)
HCT VFR BLD AUTO: 44.7 % (ref 37–47)
HDLC SERPL-MCNC: 60 MG/DL
HGB BLD-MCNC: 14.6 G/DL (ref 12.5–16)
HGB UR QL STRIP.AUTO: ABNORMAL
KETONES UR STRIP-MCNC: >80 MG/DL
LDLC SERPL CALC-MCNC: 62 MG/DL
LEUKOCYTE ESTERASE UR QL STRIP: NEGATIVE
MAGNESIUM SERPL-MCNC: 2.1 MG/DL (ref 1.8–2.4)
MCH RBC QN AUTO: 30.6 PG (ref 27–31)
MCHC RBC AUTO-ENTMCNC: 32.7 G/DL (ref 32–36)
MCV RBC AUTO: 93.7 FL (ref 78–100)
MUCOUS THREADS URNS QL MICRO: ABNORMAL
NITRITE UR QL STRIP: NEGATIVE
PH UR STRIP: 6 [PH] (ref 5–8)
PLATELET # BLD AUTO: 257 K/UL (ref 140–440)
PMV BLD AUTO: 10.8 FL (ref 7.5–11.1)
POTASSIUM SERPL-SCNC: 3.5 MMOL/L (ref 3.5–5.1)
POTASSIUM, UR: 80.4 MMOL/L (ref 25–150)
PROT SERPL-MCNC: 6.6 G/DL (ref 6.4–8.2)
PROT UR STRIP-MCNC: 30 MG/DL
RBC # BLD AUTO: 4.77 M/UL (ref 4.2–5.4)
RBC #/AREA URNS HPF: 10 /HPF (ref 0–2)
SODIUM SERPL-SCNC: 135 MMOL/L (ref 136–145)
SODIUM UR-SCNC: 102 MMOL/L (ref 40–220)
SP GR UR STRIP: 1.02 (ref 1–1.03)
T4 FREE SERPL-MCNC: 1.4 NG/DL (ref 0.9–1.8)
TOTAL PROTEIN, URINE: 63 MG/DL
TRIGL SERPL-MCNC: 65 MG/DL
TSH SERPL DL<=0.05 MIU/L-ACNC: 2.49 UIU/ML (ref 0.27–4.2)
URINE TOTAL PROTEIN CREATININE RATIO: 0.26 (ref 0–0.2)
UROBILINOGEN UR STRIP-ACNC: 0.2 EU/DL (ref 0–1)
VIT B12 SERPL-MCNC: 381 PG/ML (ref 211–911)
WBC #/AREA URNS HPF: 0 /HPF (ref 0–5)
WBC OTHER # BLD: 10.5 K/UL (ref 4–10.5)

## 2025-08-07 PROCEDURE — 94761 N-INVAS EAR/PLS OXIMETRY MLT: CPT

## 2025-08-07 PROCEDURE — 6370000000 HC RX 637 (ALT 250 FOR IP)

## 2025-08-07 PROCEDURE — 83735 ASSAY OF MAGNESIUM: CPT

## 2025-08-07 PROCEDURE — 84439 ASSAY OF FREE THYROXINE: CPT

## 2025-08-07 PROCEDURE — 93306 TTE W/DOPPLER COMPLETE: CPT | Performed by: INTERNAL MEDICINE

## 2025-08-07 PROCEDURE — 97166 OT EVAL MOD COMPLEX 45 MIN: CPT

## 2025-08-07 PROCEDURE — 6370000000 HC RX 637 (ALT 250 FOR IP): Performed by: INTERNAL MEDICINE

## 2025-08-07 PROCEDURE — 84133 ASSAY OF URINE POTASSIUM: CPT

## 2025-08-07 PROCEDURE — 2580000003 HC RX 258: Performed by: INTERNAL MEDICINE

## 2025-08-07 PROCEDURE — 6360000002 HC RX W HCPCS

## 2025-08-07 PROCEDURE — 82570 ASSAY OF URINE CREATININE: CPT

## 2025-08-07 PROCEDURE — 82436 ASSAY OF URINE CHLORIDE: CPT

## 2025-08-07 PROCEDURE — 82306 VITAMIN D 25 HYDROXY: CPT

## 2025-08-07 PROCEDURE — 80061 LIPID PANEL: CPT

## 2025-08-07 PROCEDURE — 93306 TTE W/DOPPLER COMPLETE: CPT

## 2025-08-07 PROCEDURE — 82607 VITAMIN B-12: CPT

## 2025-08-07 PROCEDURE — 92610 EVALUATE SWALLOWING FUNCTION: CPT

## 2025-08-07 PROCEDURE — 84156 ASSAY OF PROTEIN URINE: CPT

## 2025-08-07 PROCEDURE — 84443 ASSAY THYROID STIM HORMONE: CPT

## 2025-08-07 PROCEDURE — 84300 ASSAY OF URINE SODIUM: CPT

## 2025-08-07 PROCEDURE — 2500000003 HC RX 250 WO HCPCS: Performed by: INTERNAL MEDICINE

## 2025-08-07 PROCEDURE — 6370000000 HC RX 637 (ALT 250 FOR IP): Performed by: OBSTETRICS & GYNECOLOGY

## 2025-08-07 PROCEDURE — 81001 URINALYSIS AUTO W/SCOPE: CPT

## 2025-08-07 PROCEDURE — 96374 THER/PROPH/DIAG INJ IV PUSH: CPT

## 2025-08-07 PROCEDURE — G0378 HOSPITAL OBSERVATION PER HR: HCPCS

## 2025-08-07 PROCEDURE — 99223 1ST HOSP IP/OBS HIGH 75: CPT | Performed by: PSYCHIATRY & NEUROLOGY

## 2025-08-07 PROCEDURE — 82746 ASSAY OF FOLIC ACID SERUM: CPT

## 2025-08-07 PROCEDURE — 83036 HEMOGLOBIN GLYCOSYLATED A1C: CPT

## 2025-08-07 PROCEDURE — 36415 COLL VENOUS BLD VENIPUNCTURE: CPT

## 2025-08-07 PROCEDURE — 76536 US EXAM OF HEAD AND NECK: CPT

## 2025-08-07 PROCEDURE — 85027 COMPLETE CBC AUTOMATED: CPT

## 2025-08-07 PROCEDURE — 96376 TX/PRO/DX INJ SAME DRUG ADON: CPT

## 2025-08-07 PROCEDURE — 6370000000 HC RX 637 (ALT 250 FOR IP): Performed by: NURSE PRACTITIONER

## 2025-08-07 PROCEDURE — 97162 PT EVAL MOD COMPLEX 30 MIN: CPT

## 2025-08-07 PROCEDURE — 80053 COMPREHEN METABOLIC PANEL: CPT

## 2025-08-07 PROCEDURE — 6360000002 HC RX W HCPCS: Performed by: INTERNAL MEDICINE

## 2025-08-07 RX ORDER — METOCLOPRAMIDE HYDROCHLORIDE 5 MG/ML
10 INJECTION INTRAMUSCULAR; INTRAVENOUS EVERY 6 HOURS PRN
Status: DISCONTINUED | OUTPATIENT
Start: 2025-08-07 | End: 2025-08-07 | Stop reason: SDUPTHER

## 2025-08-07 RX ORDER — SODIUM CHLORIDE, SODIUM LACTATE, POTASSIUM CHLORIDE, AND CALCIUM CHLORIDE .6; .31; .03; .02 G/100ML; G/100ML; G/100ML; G/100ML
500 INJECTION, SOLUTION INTRAVENOUS ONCE
Status: COMPLETED | OUTPATIENT
Start: 2025-08-07 | End: 2025-08-07

## 2025-08-07 RX ORDER — LANOLIN ALCOHOL/MO/W.PET/CERES
50 CREAM (GRAM) TOPICAL ONCE
Status: DISCONTINUED | OUTPATIENT
Start: 2025-08-07 | End: 2025-08-08 | Stop reason: HOSPADM

## 2025-08-07 RX ORDER — LABETALOL HYDROCHLORIDE 5 MG/ML
10 INJECTION, SOLUTION INTRAVENOUS EVERY 4 HOURS PRN
Status: DISCONTINUED | OUTPATIENT
Start: 2025-08-07 | End: 2025-08-08 | Stop reason: HOSPADM

## 2025-08-07 RX ORDER — ACETAMINOPHEN 500 MG
1000 TABLET ORAL EVERY 6 HOURS
Status: DISPENSED | OUTPATIENT
Start: 2025-08-07 | End: 2025-08-08

## 2025-08-07 RX ORDER — ONDANSETRON 4 MG/1
4 TABLET, ORALLY DISINTEGRATING ORAL EVERY 6 HOURS
Status: DISCONTINUED | OUTPATIENT
Start: 2025-08-07 | End: 2025-08-07

## 2025-08-07 RX ORDER — ACETAMINOPHEN 325 MG/1
650 TABLET ORAL EVERY 4 HOURS PRN
Status: DISCONTINUED | OUTPATIENT
Start: 2025-08-07 | End: 2025-08-08 | Stop reason: HOSPADM

## 2025-08-07 RX ORDER — LABETALOL HYDROCHLORIDE 5 MG/ML
10 INJECTION, SOLUTION INTRAVENOUS ONCE
Status: DISCONTINUED | OUTPATIENT
Start: 2025-08-07 | End: 2025-08-07

## 2025-08-07 RX ORDER — METOCLOPRAMIDE HYDROCHLORIDE 5 MG/ML
5 INJECTION INTRAMUSCULAR; INTRAVENOUS ONCE
Status: COMPLETED | OUTPATIENT
Start: 2025-08-07 | End: 2025-08-07

## 2025-08-07 RX ORDER — HYDRALAZINE HYDROCHLORIDE 50 MG/1
50 TABLET, FILM COATED ORAL EVERY 12 HOURS SCHEDULED
Status: DISCONTINUED | OUTPATIENT
Start: 2025-08-07 | End: 2025-08-08 | Stop reason: HOSPADM

## 2025-08-07 RX ORDER — METOCLOPRAMIDE HYDROCHLORIDE 5 MG/ML
5 INJECTION INTRAMUSCULAR; INTRAVENOUS EVERY 6 HOURS PRN
Status: DISCONTINUED | OUTPATIENT
Start: 2025-08-07 | End: 2025-08-08 | Stop reason: HOSPADM

## 2025-08-07 RX ORDER — CITALOPRAM HYDROBROMIDE 20 MG/1
10 TABLET ORAL DAILY
Status: DISCONTINUED | OUTPATIENT
Start: 2025-08-07 | End: 2025-08-08 | Stop reason: HOSPADM

## 2025-08-07 RX ORDER — NIFEDIPINE 30 MG/1
30 TABLET, EXTENDED RELEASE ORAL DAILY
Status: DISCONTINUED | OUTPATIENT
Start: 2025-08-07 | End: 2025-08-07

## 2025-08-07 RX ORDER — CAFFEINE 200 MG
200 TABLET ORAL EVERY 6 HOURS
Status: DISPENSED | OUTPATIENT
Start: 2025-08-07 | End: 2025-08-08

## 2025-08-07 RX ORDER — PROMETHAZINE HYDROCHLORIDE 25 MG/ML
6.25 INJECTION, SOLUTION INTRAMUSCULAR; INTRAVENOUS EVERY 6 HOURS PRN
Status: DISCONTINUED | OUTPATIENT
Start: 2025-08-07 | End: 2025-08-08

## 2025-08-07 RX ORDER — SODIUM CHLORIDE, SODIUM LACTATE, POTASSIUM CHLORIDE, CALCIUM CHLORIDE 600; 310; 30; 20 MG/100ML; MG/100ML; MG/100ML; MG/100ML
INJECTION, SOLUTION INTRAVENOUS CONTINUOUS
Status: DISCONTINUED | OUTPATIENT
Start: 2025-08-07 | End: 2025-08-08 | Stop reason: HOSPADM

## 2025-08-07 RX ORDER — LABETALOL HYDROCHLORIDE 5 MG/ML
10 INJECTION, SOLUTION INTRAVENOUS EVERY 4 HOURS PRN
Status: DISCONTINUED | OUTPATIENT
Start: 2025-08-07 | End: 2025-08-07 | Stop reason: SDUPTHER

## 2025-08-07 RX ORDER — NIFEDIPINE 30 MG/1
60 TABLET, EXTENDED RELEASE ORAL DAILY
Status: DISCONTINUED | OUTPATIENT
Start: 2025-08-08 | End: 2025-08-08 | Stop reason: HOSPADM

## 2025-08-07 RX ORDER — METOCLOPRAMIDE HYDROCHLORIDE 5 MG/ML
5 INJECTION INTRAMUSCULAR; INTRAVENOUS EVERY 6 HOURS PRN
Status: DISCONTINUED | OUTPATIENT
Start: 2025-08-07 | End: 2025-08-07

## 2025-08-07 RX ORDER — METOCLOPRAMIDE HYDROCHLORIDE 5 MG/ML
20 INJECTION INTRAMUSCULAR; INTRAVENOUS ONCE
Status: COMPLETED | OUTPATIENT
Start: 2025-08-07 | End: 2025-08-07

## 2025-08-07 RX ADMIN — METOCLOPRAMIDE 5 MG: 5 INJECTION, SOLUTION INTRAMUSCULAR; INTRAVENOUS at 14:31

## 2025-08-07 RX ADMIN — CAFFEINE 200 MG: 200 TABLET ORAL at 16:01

## 2025-08-07 RX ADMIN — METOCLOPRAMIDE 20 MG: 5 INJECTION, SOLUTION INTRAMUSCULAR; INTRAVENOUS at 16:57

## 2025-08-07 RX ADMIN — ACETAMINOPHEN 1000 MG: 500 TABLET ORAL at 21:03

## 2025-08-07 RX ADMIN — HYDRALAZINE HYDROCHLORIDE 50 MG: 50 TABLET ORAL at 21:04

## 2025-08-07 RX ADMIN — CITALOPRAM HYDROBROMIDE 10 MG: 20 TABLET ORAL at 14:31

## 2025-08-07 RX ADMIN — HYDRALAZINE HYDROCHLORIDE 50 MG: 50 TABLET ORAL at 08:41

## 2025-08-07 RX ADMIN — Medication 1 TABLET: at 08:41

## 2025-08-07 RX ADMIN — ACETAMINOPHEN 1000 MG: 500 TABLET ORAL at 14:30

## 2025-08-07 RX ADMIN — SODIUM CHLORIDE, PRESERVATIVE FREE 10 ML: 5 INJECTION INTRAVENOUS at 08:41

## 2025-08-07 RX ADMIN — ACETAMINOPHEN 650 MG: 325 TABLET ORAL at 10:45

## 2025-08-07 RX ADMIN — ACETAMINOPHEN 650 MG: 325 TABLET ORAL at 06:48

## 2025-08-07 RX ADMIN — CAFFEINE 200 MG: 200 TABLET ORAL at 21:05

## 2025-08-07 RX ADMIN — NIFEDIPINE 30 MG: 30 TABLET, FILM COATED, EXTENDED RELEASE ORAL at 01:12

## 2025-08-07 RX ADMIN — SODIUM CHLORIDE, SODIUM LACTATE, POTASSIUM CHLORIDE, AND CALCIUM CHLORIDE 500 ML: .6; .31; .03; .02 INJECTION, SOLUTION INTRAVENOUS at 18:43

## 2025-08-07 RX ADMIN — SODIUM CHLORIDE, SODIUM LACTATE, POTASSIUM CHLORIDE, AND CALCIUM CHLORIDE: .6; .31; .03; .02 INJECTION, SOLUTION INTRAVENOUS at 21:05

## 2025-08-07 ASSESSMENT — PAIN DESCRIPTION - LOCATION
LOCATION: HEAD
LOCATION: HEAD

## 2025-08-07 ASSESSMENT — PAIN SCALES - GENERAL
PAINLEVEL_OUTOF10: 4
PAINLEVEL_OUTOF10: 2

## 2025-08-07 ASSESSMENT — PAIN - FUNCTIONAL ASSESSMENT: PAIN_FUNCTIONAL_ASSESSMENT: ACTIVITIES ARE NOT PREVENTED

## 2025-08-07 ASSESSMENT — PAIN DESCRIPTION - DESCRIPTORS
DESCRIPTORS: ACHING
DESCRIPTORS: ACHING

## 2025-08-08 VITALS
OXYGEN SATURATION: 98 % | RESPIRATION RATE: 18 BRPM | DIASTOLIC BLOOD PRESSURE: 90 MMHG | BODY MASS INDEX: 19.95 KG/M2 | WEIGHT: 116.84 LBS | HEIGHT: 64 IN | SYSTOLIC BLOOD PRESSURE: 156 MMHG | HEART RATE: 80 BPM | TEMPERATURE: 98.4 F

## 2025-08-08 DIAGNOSIS — N94.89 OTH COND ASSOC W FEMALE GENITAL ORGANS AND MENSTRUAL CYCLE: Primary | ICD-10-CM

## 2025-08-08 LAB
ALBUMIN SERPL-MCNC: 4.3 G/DL (ref 3.4–5)
ALBUMIN/GLOB SERPL: 1.6 {RATIO} (ref 1.1–2.2)
ALP SERPL-CCNC: 53 U/L (ref 40–129)
ALT SERPL-CCNC: 8 U/L (ref 10–40)
ANION GAP SERPL CALCULATED.3IONS-SCNC: 16 MMOL/L (ref 9–17)
AST SERPL-CCNC: 17 U/L (ref 15–37)
B-HCG SERPL EIA 3RD IS-ACNC: 54.1 MIU/ML
BILIRUB SERPL-MCNC: 0.7 MG/DL (ref 0–1)
BUN SERPL-MCNC: 16 MG/DL (ref 7–20)
CALCIUM SERPL-MCNC: 9.4 MG/DL (ref 8.3–10.6)
CHLORIDE SERPL-SCNC: 104 MMOL/L (ref 99–110)
CO2 SERPL-SCNC: 17 MMOL/L (ref 21–32)
CREAT SERPL-MCNC: 0.8 MG/DL (ref 0.6–1.1)
EKG ATRIAL RATE: 80 BPM
EKG DIAGNOSIS: NORMAL
EKG P AXIS: 77 DEGREES
EKG P-R INTERVAL: 110 MS
EKG Q-T INTERVAL: 404 MS
EKG QRS DURATION: 72 MS
EKG QTC CALCULATION (BAZETT): 465 MS
EKG R AXIS: 43 DEGREES
EKG T AXIS: 37 DEGREES
EKG VENTRICULAR RATE: 80 BPM
ERYTHROCYTE [DISTWIDTH] IN BLOOD BY AUTOMATED COUNT: 12.6 % (ref 11.7–14.9)
GFR, ESTIMATED: 77 ML/MIN/1.73M2
GLUCOSE SERPL-MCNC: 102 MG/DL (ref 74–99)
HCT VFR BLD AUTO: 44.1 % (ref 37–47)
HGB BLD-MCNC: 14.8 G/DL (ref 12.5–16)
MAGNESIUM SERPL-MCNC: 1.9 MG/DL (ref 1.8–2.4)
MCH RBC QN AUTO: 30.2 PG (ref 27–31)
MCHC RBC AUTO-ENTMCNC: 33.6 G/DL (ref 32–36)
MCV RBC AUTO: 90 FL (ref 78–100)
PLATELET # BLD AUTO: 298 K/UL (ref 140–440)
PMV BLD AUTO: 11.5 FL (ref 7.5–11.1)
POTASSIUM SERPL-SCNC: 3.5 MMOL/L (ref 3.5–5.1)
PROT SERPL-MCNC: 7 G/DL (ref 6.4–8.2)
RBC # BLD AUTO: 4.9 M/UL (ref 4.2–5.4)
SODIUM SERPL-SCNC: 136 MMOL/L (ref 136–145)
WBC OTHER # BLD: 16.3 K/UL (ref 4–10.5)

## 2025-08-08 PROCEDURE — 80053 COMPREHEN METABOLIC PANEL: CPT

## 2025-08-08 PROCEDURE — 85027 COMPLETE CBC AUTOMATED: CPT

## 2025-08-08 PROCEDURE — 2580000003 HC RX 258: Performed by: INTERNAL MEDICINE

## 2025-08-08 PROCEDURE — 6370000000 HC RX 637 (ALT 250 FOR IP): Performed by: INTERNAL MEDICINE

## 2025-08-08 PROCEDURE — 36415 COLL VENOUS BLD VENIPUNCTURE: CPT

## 2025-08-08 PROCEDURE — 6370000000 HC RX 637 (ALT 250 FOR IP): Performed by: NURSE PRACTITIONER

## 2025-08-08 PROCEDURE — 84702 CHORIONIC GONADOTROPIN TEST: CPT

## 2025-08-08 PROCEDURE — 6370000000 HC RX 637 (ALT 250 FOR IP): Performed by: OBSTETRICS & GYNECOLOGY

## 2025-08-08 PROCEDURE — G0378 HOSPITAL OBSERVATION PER HR: HCPCS

## 2025-08-08 PROCEDURE — 99232 SBSQ HOSP IP/OBS MODERATE 35: CPT

## 2025-08-08 PROCEDURE — 83735 ASSAY OF MAGNESIUM: CPT

## 2025-08-08 PROCEDURE — 93010 ELECTROCARDIOGRAM REPORT: CPT | Performed by: INTERNAL MEDICINE

## 2025-08-08 PROCEDURE — 94761 N-INVAS EAR/PLS OXIMETRY MLT: CPT

## 2025-08-08 RX ORDER — HYDRALAZINE HYDROCHLORIDE 50 MG/1
50 TABLET, FILM COATED ORAL 3 TIMES DAILY
Qty: 90 TABLET | Refills: 0 | Status: SHIPPED | OUTPATIENT
Start: 2025-08-08

## 2025-08-08 RX ORDER — CITALOPRAM HYDROBROMIDE 10 MG/1
10 TABLET ORAL DAILY
Qty: 30 TABLET | Refills: 0 | Status: SHIPPED | OUTPATIENT
Start: 2025-08-09

## 2025-08-08 RX ORDER — NIFEDIPINE 30 MG/1
60 TABLET, EXTENDED RELEASE ORAL DAILY
Qty: 60 TABLET | Refills: 0 | Status: SHIPPED | OUTPATIENT
Start: 2025-08-09

## 2025-08-08 RX ORDER — SWAB
1 SWAB, NON-MEDICATED MISCELLANEOUS DAILY
Qty: 30 TABLET | Refills: 0 | Status: SHIPPED | OUTPATIENT
Start: 2025-08-09

## 2025-08-08 RX ADMIN — ACETAMINOPHEN 650 MG: 325 TABLET ORAL at 08:08

## 2025-08-08 RX ADMIN — HYDRALAZINE HYDROCHLORIDE 50 MG: 50 TABLET ORAL at 08:08

## 2025-08-08 RX ADMIN — NIFEDIPINE 60 MG: 30 TABLET, FILM COATED, EXTENDED RELEASE ORAL at 08:08

## 2025-08-08 RX ADMIN — CITALOPRAM HYDROBROMIDE 10 MG: 20 TABLET ORAL at 08:08

## 2025-08-08 RX ADMIN — Medication 1 TABLET: at 08:07

## 2025-08-08 RX ADMIN — SODIUM CHLORIDE, SODIUM LACTATE, POTASSIUM CHLORIDE, AND CALCIUM CHLORIDE: .6; .31; .03; .02 INJECTION, SOLUTION INTRAVENOUS at 04:53

## 2025-08-08 ASSESSMENT — PAIN SCALES - GENERAL: PAINLEVEL_OUTOF10: 1

## 2025-08-08 ASSESSMENT — PAIN DESCRIPTION - LOCATION: LOCATION: HEAD;NECK

## 2025-08-08 ASSESSMENT — PAIN - FUNCTIONAL ASSESSMENT: PAIN_FUNCTIONAL_ASSESSMENT: ACTIVITIES ARE NOT PREVENTED

## 2025-08-08 ASSESSMENT — PAIN DESCRIPTION - DESCRIPTORS: DESCRIPTORS: ACHING

## 2025-08-11 ENCOUNTER — CARE COORDINATION (OUTPATIENT)
Dept: OTHER | Facility: CLINIC | Age: 40
End: 2025-08-11

## 2025-08-12 ENCOUNTER — CARE COORDINATION (OUTPATIENT)
Dept: OTHER | Facility: CLINIC | Age: 40
End: 2025-08-12

## 2025-08-12 DIAGNOSIS — N92.6 IRREGULAR MENSES: Primary | ICD-10-CM

## 2025-08-12 ASSESSMENT — PATIENT HEALTH QUESTIONNAIRE - PHQ9
3. TROUBLE FALLING OR STAYING ASLEEP: NOT AT ALL
5. POOR APPETITE OR OVEREATING: NOT AT ALL
9. THOUGHTS THAT YOU WOULD BE BETTER OFF DEAD, OR OF HURTING YOURSELF: NOT AT ALL
7. TROUBLE CONCENTRATING ON THINGS, SUCH AS READING THE NEWSPAPER OR WATCHING TELEVISION: NOT AT ALL
4. FEELING TIRED OR HAVING LITTLE ENERGY: MORE THAN HALF THE DAYS
5. POOR APPETITE OR OVEREATING: NOT AT ALL
SUM OF ALL RESPONSES TO PHQ QUESTIONS 1-9: 2
1. LITTLE INTEREST OR PLEASURE IN DOING THINGS: NOT AT ALL
SUM OF ALL RESPONSES TO PHQ QUESTIONS 1-9: 2
SUM OF ALL RESPONSES TO PHQ QUESTIONS 1-9: 2
8. MOVING OR SPEAKING SO SLOWLY THAT OTHER PEOPLE COULD HAVE NOTICED. OR THE OPPOSITE, BEING SO FIGETY OR RESTLESS THAT YOU HAVE BEEN MOVING AROUND A LOT MORE THAN USUAL: NOT AT ALL
8. MOVING OR SPEAKING SO SLOWLY THAT OTHER PEOPLE COULD HAVE NOTICED. OR THE OPPOSITE - BEING SO FIDGETY OR RESTLESS THAT YOU HAVE BEEN MOVING AROUND A LOT MORE THAN USUAL: NOT AT ALL
SUM OF ALL RESPONSES TO PHQ QUESTIONS 1-9: 2
2. FEELING DOWN, DEPRESSED OR HOPELESS: NOT AT ALL
10. IF YOU CHECKED OFF ANY PROBLEMS, HOW DIFFICULT HAVE THESE PROBLEMS MADE IT FOR YOU TO DO YOUR WORK, TAKE CARE OF THINGS AT HOME, OR GET ALONG WITH OTHER PEOPLE: NOT DIFFICULT AT ALL
7. TROUBLE CONCENTRATING ON THINGS, SUCH AS READING THE NEWSPAPER OR WATCHING TELEVISION: NOT AT ALL
1. LITTLE INTEREST OR PLEASURE IN DOING THINGS: NOT AT ALL
4. FEELING TIRED OR HAVING LITTLE ENERGY: MORE THAN HALF THE DAYS
SUM OF ALL RESPONSES TO PHQ QUESTIONS 1-9: 2
6. FEELING BAD ABOUT YOURSELF - OR THAT YOU ARE A FAILURE OR HAVE LET YOURSELF OR YOUR FAMILY DOWN: NOT AT ALL
10. IF YOU CHECKED OFF ANY PROBLEMS, HOW DIFFICULT HAVE THESE PROBLEMS MADE IT FOR YOU TO DO YOUR WORK, TAKE CARE OF THINGS AT HOME, OR GET ALONG WITH OTHER PEOPLE: NOT DIFFICULT AT ALL
2. FEELING DOWN, DEPRESSED OR HOPELESS: NOT AT ALL
9. THOUGHTS THAT YOU WOULD BE BETTER OFF DEAD, OR OF HURTING YOURSELF: NOT AT ALL
3. TROUBLE FALLING OR STAYING ASLEEP: NOT AT ALL
6. FEELING BAD ABOUT YOURSELF - OR THAT YOU ARE A FAILURE OR HAVE LET YOURSELF OR YOUR FAMILY DOWN: NOT AT ALL

## 2025-08-13 ENCOUNTER — HOSPITAL ENCOUNTER (OUTPATIENT)
Dept: LAB | Age: 40
Discharge: HOME | End: 2025-08-13
Payer: COMMERCIAL

## 2025-08-13 DIAGNOSIS — N92.6 IRREGULAR MENSES: ICD-10-CM

## 2025-08-13 DIAGNOSIS — R11.2 NAUSEA AND VOMITING, UNSPECIFIED VOMITING TYPE: ICD-10-CM

## 2025-08-13 LAB
ALBUMIN SERPL-MCNC: 4.5 G/DL (ref 3.4–5)
ALBUMIN/GLOB SERPL: 1.6 {RATIO}
ALP SERPL-CCNC: 50 U/L (ref 40–129)
ALT SERPL-CCNC: 8 U/L (ref 10–40)
ANION GAP SERPL CALCULATED.3IONS-SCNC: 13 MMOL/L (ref 9–17)
AST SERPL-CCNC: 17 U/L (ref 15–37)
B-HCG SERPL EIA 3RD IS-ACNC: 22 MIU/ML
BASOPHILS # BLD: 0.05 K/UL
BASOPHILS NFR BLD: 1 % (ref 0–1)
BILIRUB SERPL-MCNC: 0.5 MG/DL (ref 0–1)
BUN SERPL-MCNC: 14 MG/DL (ref 7–20)
CALCIUM SERPL-MCNC: 9.6 MG/DL (ref 8.3–10.6)
CHLORIDE SERPL-SCNC: 101 MMOL/L (ref 99–110)
CO2 SERPL-SCNC: 23 MMOL/L (ref 21–32)
CREAT SERPL-MCNC: 0.8 MG/DL (ref 0.6–1.1)
EOSINOPHIL # BLD: 0.03 K/UL
EOSINOPHILS RELATIVE PERCENT: 0 % (ref 0–3)
ERYTHROCYTE [DISTWIDTH] IN BLOOD BY AUTOMATED COUNT: 12.9 % (ref 11.7–14.9)
GFR, ESTIMATED: >90 ML/MIN/1.73M2
GLUCOSE SERPL-MCNC: 107 MG/DL (ref 74–99)
HCT VFR BLD AUTO: 45.4 % (ref 37–47)
HGB BLD-MCNC: 15.1 G/DL (ref 12.5–16)
IMM GRANULOCYTES # BLD AUTO: 0.02 K/UL
IMM GRANULOCYTES NFR BLD: 0 %
LYMPHOCYTES NFR BLD: 2.01 K/UL
LYMPHOCYTES RELATIVE PERCENT: 26 % (ref 24–44)
MCH RBC QN AUTO: 30.6 PG (ref 27–31)
MCHC RBC AUTO-ENTMCNC: 33.3 G/DL (ref 32–36)
MCV RBC AUTO: 92.1 FL (ref 78–100)
MONOCYTES NFR BLD: 0.57 K/UL
MONOCYTES NFR BLD: 7 % (ref 0–5)
NEUTROPHILS NFR BLD: 65 % (ref 36–66)
NEUTS SEG NFR BLD: 5.02 K/UL
PLATELET # BLD AUTO: 283 K/UL (ref 140–440)
PMV BLD AUTO: 10.2 FL (ref 7.5–11.1)
POTASSIUM SERPL-SCNC: 3.7 MMOL/L (ref 3.5–5.1)
PROT SERPL-MCNC: 7.3 G/DL (ref 6.4–8.2)
RBC # BLD AUTO: 4.93 M/UL (ref 4.2–5.4)
SODIUM SERPL-SCNC: 137 MMOL/L (ref 136–145)
WBC OTHER # BLD: 7.7 K/UL (ref 4–10.5)

## 2025-08-13 PROCEDURE — 84702 CHORIONIC GONADOTROPIN TEST: CPT

## 2025-08-13 PROCEDURE — 36415 COLL VENOUS BLD VENIPUNCTURE: CPT

## 2025-08-13 PROCEDURE — 80053 COMPREHEN METABOLIC PANEL: CPT

## 2025-08-13 PROCEDURE — 85025 COMPLETE CBC W/AUTO DIFF WBC: CPT

## 2025-08-14 ENCOUNTER — OFFICE VISIT (OUTPATIENT)
Dept: OBGYN | Age: 40
End: 2025-08-14
Payer: COMMERCIAL

## 2025-08-14 ENCOUNTER — TELEPHONE (OUTPATIENT)
Dept: FAMILY MEDICINE CLINIC | Age: 40
End: 2025-08-14

## 2025-08-14 VITALS
WEIGHT: 122 LBS | SYSTOLIC BLOOD PRESSURE: 160 MMHG | HEART RATE: 97 BPM | HEIGHT: 64 IN | BODY MASS INDEX: 20.83 KG/M2 | DIASTOLIC BLOOD PRESSURE: 103 MMHG

## 2025-08-14 DIAGNOSIS — E04.1 THYROID NODULE: ICD-10-CM

## 2025-08-14 DIAGNOSIS — N94.6 DYSMENORRHEA: Primary | ICD-10-CM

## 2025-08-14 DIAGNOSIS — O02.81 CHEMICAL PREGNANCY: ICD-10-CM

## 2025-08-14 DIAGNOSIS — N95.1 MENOPAUSAL SYMPTOM: ICD-10-CM

## 2025-08-14 PROCEDURE — 99214 OFFICE O/P EST MOD 30 MIN: CPT | Performed by: OBSTETRICS & GYNECOLOGY

## 2025-08-15 ENCOUNTER — CARE COORDINATION (OUTPATIENT)
Dept: OTHER | Facility: CLINIC | Age: 40
End: 2025-08-15

## 2025-08-19 ENCOUNTER — TELEPHONE (OUTPATIENT)
Dept: OBGYN | Age: 40
End: 2025-08-19

## 2025-08-21 ENCOUNTER — CARE COORDINATION (OUTPATIENT)
Dept: OTHER | Facility: CLINIC | Age: 40
End: 2025-08-21

## 2025-08-25 ENCOUNTER — CARE COORDINATION (OUTPATIENT)
Dept: OTHER | Facility: CLINIC | Age: 40
End: 2025-08-25

## 2025-09-02 ENCOUNTER — CARE COORDINATION (OUTPATIENT)
Dept: OTHER | Facility: CLINIC | Age: 40
End: 2025-09-02

## 2025-09-04 PROBLEM — O02.81 CHEMICAL PREGNANCY: Status: ACTIVE | Noted: 2025-09-04

## 2025-09-04 PROBLEM — N95.1 MENOPAUSAL SYMPTOMS: Status: ACTIVE | Noted: 2025-09-04
